# Patient Record
Sex: MALE | Race: WHITE | Employment: FULL TIME | URBAN - METROPOLITAN AREA
[De-identification: names, ages, dates, MRNs, and addresses within clinical notes are randomized per-mention and may not be internally consistent; named-entity substitution may affect disease eponyms.]

---

## 2017-06-07 ENCOUNTER — OFFICE VISIT (OUTPATIENT)
Dept: CARDIOLOGY CLINIC | Age: 47
End: 2017-06-07

## 2017-06-07 VITALS
HEIGHT: 66 IN | HEART RATE: 42 BPM | DIASTOLIC BLOOD PRESSURE: 60 MMHG | SYSTOLIC BLOOD PRESSURE: 116 MMHG | BODY MASS INDEX: 23.56 KG/M2 | RESPIRATION RATE: 18 BRPM | WEIGHT: 146.6 LBS

## 2017-06-07 DIAGNOSIS — R55 NEAR SYNCOPE: ICD-10-CM

## 2017-06-07 DIAGNOSIS — R01.1 HEART MURMUR: ICD-10-CM

## 2017-06-07 DIAGNOSIS — R00.1 BRADYCARDIA: Primary | ICD-10-CM

## 2017-06-07 NOTE — MR AVS SNAPSHOT
Visit Information Date & Time Provider Department Dept. Phone Encounter #  
 6/7/2017 10:00 AM Elda Haines MD [facility] [id number] Follow-up Instructions Follow-up and Disposition History Upcoming Health Maintenance Date Due DTaP/Tdap/Td series (1 - Tdap) 9/8/1992 INFLUENZA AGE 9 TO ADULT 8/1/2017 Allergies as of 6/7/2017  Review Complete On: 6/7/2017 By: Elda Haines MD  
 No Known Allergies Current Immunizations  Never Reviewed No immunizations on file. Not reviewed this visit You Were Diagnosed With   
  
 Codes Comments Bradycardia    -  Primary ICD-10-CM: R00.1 ICD-9-CM: 427.89 Near syncope     ICD-10-CM: R55 
ICD-9-CM: 780.2 Heart murmur     ICD-10-CM: R01.1 ICD-9-CM: 733. 2 Vitals BP Pulse Resp Height(growth percentile) Weight(growth percentile) BMI  
 116/60 (!) 42 18 5' 6\" (1.676 m) 146 lb 9.6 oz (66.5 kg) 23.66 kg/m2 Smoking Status Former Smoker Vitals History BMI and BSA Data Body Mass Index Body Surface Area  
 23.66 kg/m 2 1.76 m 2 Your Updated Medication List  
  
Notice  As of 6/7/2017 11:29 AM  
 You have not been prescribed any medications. We Performed the Following AMB POC EKG ROUTINE W/ 12 LEADS, INTER & REP [15651 CPT(R)] To-Do List   
 06/07/2017 ECHO:  2D ECHO COMPLETE ADULT (TTE) W OR WO CONTR   
  
 06/07/2017 ECHO:  ECHO TTE STRESS EXERCISE TREADMILL COMP   
  
 06/07/2017 ECHO:  ECHO TTE STRESS EXRCSE COMP W OR WO CONTR   
  
 06/15/2017 10:00 AM  
  Appointment with [facility] [address] LAB RM 1; [facility] STRESS LAB at [facility] NON-INVASIVE CARD ([phone]) Age Limit for ALL Heart procedures @ all [facility] facilities: 18 yrs and older only. Under the age of 25 suggest referring to [facility] ([phone]). PATIENTS SHOULD NOT BRING CHILDREN UNATTENDED TO APPTS.     Weight Limit: 350 lbs  1-No eating or coffee after midnight 2-Do not take Beta Blocker or Calcium Channel blocker the day of the test unless specified by cardiologist. Please bring with. 3-Do not take diabetic meds day of test (bring with) 4-Patient will be walking/running on a Treadmill. Patient should wear comfortable shoes that are suitable for walking (NO Sandals/Flip Flops, High Heels, etc.) & comfortable clothing.  
  
 06/15/2017 11:00 AM  
  Appointment with [facility] LAB RM 1 at [facility] NON-INVASIVE CARD ([phone]) Age Limit for ALL Heart procedures @ all [facility] facilities: 18 yrs and older only. Under the age of 25, refer to [address] [place] [address] ([phone]). This study requires patient to bring a written physician's order or MD office may fax the order to Central Scheduling at [phone]. Patient needs to bring a current list of all medications. No preparation is required for this study. Introducing [facility] SERVICES! Ariadna Paulino introduces [employer] patient portal. Now you can access parts of your medical record, email your doctor's office, and request medication refills online. 1. In your internet browser, go to [email or web address] 2. Click on the First Time User? Click Here link in the Sign In box. You will see the New Member Sign Up page. 3. Enter your [employer] Access Code exactly as it appears below. You will not need to use this code after youve completed the sign-up process. If you do not sign up before the expiration date, you must request a new code. · [employer] Access Code: [email or web address] Expires: 9/5/2017  9:53 AM 
 
4. Enter the last four digits of your Social Security Number (xxxx) and Date of Birth (mm/dd/yyyy) as indicated and click Submit. You will be taken to the next sign-up page. 5. Create a [employer] ID. This will be your [employer] login ID and cannot be changed, so think of one that is secure and easy to remember. 6. Create a [employer] password. You can change your password at any time. 7. Enter your Password Reset Question and Answer. This can be used at a later time if you forget your password. 8. Enter your e-mail address. You will receive e-mail notification when new information is available in [address] 9. Click Sign Up. You can now view and download portions of your medical record. 10. Click the Download Summary menu link to download a portable copy of your medical information. If you have questions, please visit the Frequently Asked Questions section of the [employer] website. Remember, [employer] is NOT to be used for urgent needs. For medical emergencies, dial 911. Now available from your iPhone and Android! Please provide this summary of care documentation to your next provider. Your primary care clinician is listed as NOT ON FILE. If you have any questions after today's visit, please call [phone].

## 2017-06-09 ENCOUNTER — HOSPITAL ENCOUNTER (EMERGENCY)
Age: 47
Discharge: HOME | End: 2017-06-09
Attending: EMERGENCY MEDICINE | Admitting: EMERGENCY MEDICINE
Payer: SELF-PAY

## 2017-06-09 VITALS
HEART RATE: 39 BPM | DIASTOLIC BLOOD PRESSURE: 52 MMHG | SYSTOLIC BLOOD PRESSURE: 123 MMHG | TEMPERATURE: 97.9 F | OXYGEN SATURATION: 100 % | RESPIRATION RATE: 16 BRPM

## 2017-06-09 DIAGNOSIS — R00.1 SINUS BRADYCARDIA: ICD-10-CM

## 2017-06-09 DIAGNOSIS — R55 NEAR SYNCOPE: Primary | ICD-10-CM

## 2017-06-09 LAB
ANION GAP BLD CALC-SCNC: 8 MMOL/L (ref 3–18)
ATRIAL RATE: 42 BPM
BASOPHILS # BLD AUTO: 0 K/UL (ref 0–0.06)
BASOPHILS # BLD: 0 % (ref 0–2)
BUN SERPL-MCNC: 21 MG/DL (ref 7–18)
BUN/CREAT SERPL: 30 (ref 12–20)
CALCIUM SERPL-MCNC: 9.5 MG/DL (ref 8.5–10.1)
CALCULATED P AXIS, ECG09: 38 DEGREES
CALCULATED R AXIS, ECG10: 71 DEGREES
CALCULATED T AXIS, ECG11: 65 DEGREES
CHLORIDE SERPL-SCNC: 103 MMOL/L (ref 100–108)
CK MB CFR SERPL CALC: 1.3 % (ref 0–4)
CK MB SERPL-MCNC: 1.8 NG/ML (ref 5–25)
CK SERPL-CCNC: 140 U/L (ref 39–308)
CO2 SERPL-SCNC: 27 MMOL/L (ref 21–32)
CREAT SERPL-MCNC: 0.7 MG/DL (ref 0.6–1.3)
DIAGNOSIS, 93000: NORMAL
DIFFERENTIAL METHOD BLD: ABNORMAL
EOSINOPHIL # BLD: 0.3 K/UL (ref 0–0.4)
EOSINOPHIL NFR BLD: 5 % (ref 0–5)
ERYTHROCYTE [DISTWIDTH] IN BLOOD BY AUTOMATED COUNT: 12.4 % (ref 11.6–14.5)
GLUCOSE SERPL-MCNC: 93 MG/DL (ref 74–99)
HCT VFR BLD AUTO: 39.2 % (ref 36–48)
HGB BLD-MCNC: 13.9 G/DL (ref 13–16)
LYMPHOCYTES # BLD AUTO: 32 % (ref 21–52)
LYMPHOCYTES # BLD: 1.7 K/UL (ref 0.9–3.6)
MAGNESIUM SERPL-MCNC: 2.2 MG/DL (ref 1.6–2.6)
MCH RBC QN AUTO: 30.2 PG (ref 24–34)
MCHC RBC AUTO-ENTMCNC: 35.5 G/DL (ref 31–37)
MCV RBC AUTO: 85 FL (ref 74–97)
MONOCYTES # BLD: 0.4 K/UL (ref 0.05–1.2)
MONOCYTES NFR BLD AUTO: 7 % (ref 3–10)
NEUTS SEG # BLD: 3 K/UL (ref 1.8–8)
NEUTS SEG NFR BLD AUTO: 56 % (ref 40–73)
P-R INTERVAL, ECG05: 158 MS
PLATELET # BLD AUTO: 230 K/UL (ref 135–420)
PMV BLD AUTO: 10.6 FL (ref 9.2–11.8)
POTASSIUM SERPL-SCNC: 3.5 MMOL/L (ref 3.5–5.5)
Q-T INTERVAL, ECG07: 492 MS
QRS DURATION, ECG06: 104 MS
QTC CALCULATION (BEZET), ECG08: 410 MS
RBC # BLD AUTO: 4.61 M/UL (ref 4.7–5.5)
SODIUM SERPL-SCNC: 138 MMOL/L (ref 136–145)
TROPONIN I SERPL-MCNC: <0.02 NG/ML (ref 0–0.04)
VENTRICULAR RATE, ECG03: 42 BPM
WBC # BLD AUTO: 5.4 K/UL (ref 4.6–13.2)

## 2017-06-09 PROCEDURE — 80048 BASIC METABOLIC PNL TOTAL CA: CPT | Performed by: EMERGENCY MEDICINE

## 2017-06-09 PROCEDURE — 85025 COMPLETE CBC W/AUTO DIFF WBC: CPT | Performed by: EMERGENCY MEDICINE

## 2017-06-09 PROCEDURE — 93005 ELECTROCARDIOGRAM TRACING: CPT

## 2017-06-09 PROCEDURE — 82550 ASSAY OF CK (CPK): CPT | Performed by: EMERGENCY MEDICINE

## 2017-06-09 PROCEDURE — 99285 EMERGENCY DEPT VISIT HI MDM: CPT

## 2017-06-09 PROCEDURE — 83735 ASSAY OF MAGNESIUM: CPT | Performed by: EMERGENCY MEDICINE

## 2017-06-09 NOTE — ED TRIAGE NOTES
\"I was driving to work and suddenly felt like I was about to pass out. I couldn't drive anymore so I walked to the fire department and they brought me here. \"

## 2017-06-09 NOTE — LETTER
NOTIFICATION RETURN TO WORK / SCHOOL 
 
6/9/2017 10:26 AM 
 
Mr. Shayla Babcock [address] [address] To Whom It May Concern: 
 
Shayla Babcock is currently under the care of [facility] EMERGENCY DEPT. He will return to work on: Saturday, Janey 10, 2017, however, he is not to drive until reevaluated by his physician. If there are questions or concerns please have the patient contact our office. Sincerely, Heber Brown MD

## 2017-06-09 NOTE — ED PROVIDER NOTES
HPI   40 y o smoker with history of bradycardia under care of Dr Beltran Milner, had episode of dizziness (lightheadedness) today. He was driving his truck, sudden onset of lightheadedness which he has hd before. Some numbness into his left arm. 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 No chest pain, arm/jaw pain or  SOB. No sweating or nausea. This week he's supposed to get a cardiac event monitor to try to further ilucidate these episodes. Has known sinus bradycardia at baseline. He's cut back his smoking to about 3 cigarettes per week. Boarderline ekg changes with stress test and Neg cardiac cath in 2014     Past Medical History:   Diagnosis Date    Bradycardia     History of myocardial perfusion scan 03/05/2014    No ischemia or prior infarction. EF 53%. No RWMA. Borderline positive EKG on pharm stress test.       Past Surgical History:   Procedure Laterality Date    HX HEART CATHETERIZATION  3/7/14         Family History:   Problem Relation Age of Onset    Heart Attack Father     Heart Surgery Father     Coronary Artery Disease Father        Social History     Social History    Marital status:      Spouse name: N/A    Number of children: N/A    Years of education: N/A     Occupational History    Not on file. Social History Main Topics    Smoking status: Former Smoker     Types: Cigarettes     Quit date: 12/4/2013    Smokeless tobacco: Current User    Alcohol use No      Comment: Quit 4 months ago    Drug use: Yes     Special: Marijuana    Sexual activity: Not on file     Other Topics Concern    Not on file     Social History Narrative         ALLERGIES: Review of patient's allergies indicates no known allergies. Review of Systems   Constitutional: Negative for diaphoresis and fever. HENT: Negative for congestion and sore throat. Eyes: Negative for pain and itching. Respiratory: Negative for cough, chest tightness and shortness of breath. Cardiovascular: Negative for chest pain and palpitations. Gastrointestinal: Negative for abdominal pain and diarrhea. Endocrine: Negative for polydipsia and polyuria. Genitourinary: Negative for dysuria and hematuria. Musculoskeletal: Negative for arthralgias and myalgias. Skin: Negative for rash and wound. Neurological: Negative for seizures and syncope. Hematological: Does not bruise/bleed easily. Psychiatric/Behavioral: Negative for agitation and hallucinations. Vitals:    06/09/17 0841   BP: 121/65   Pulse: (!) 38   Resp: 16   Temp: 97.9 °F (36.6 °C)   SpO2: 100%            Physical Exam   Constitutional: He appears well-developed and well-nourished. HENT:   Head: Normocephalic and atraumatic. Eyes: Conjunctivae are normal. No scleral icterus. Neck: Normal range of motion. Neck supple. No JVD present. Cardiovascular: Regular rhythm and normal heart sounds. Bradycardia present. 4 intact extremity pulses   Pulmonary/Chest: Effort normal and breath sounds normal.   Abdominal: Soft. Bowel sounds are normal.   Musculoskeletal: Normal range of motion. Lymphadenopathy:     He has no cervical adenopathy. Neurological: He is alert. He has normal strength. No cranial nerve deficit or sensory deficit. Skin: Skin is warm and dry. Nursing note and vitals reviewed. MDM  ED Course   He's neurologically intact and no vertiginous symptoms so CVA unlikely. ACS unlikely as he had no cp/sob/nausea/diaphoresis. HEART score 3 (age, EKG, smoking)  Episode was near syncope so will ensure electrolytes are normal, then sounds like Dr Shakira Moctezuma wants a cardiac event monitor so will likely be discharged and get event monitor.      Procedures     Vitals:  Patient Vitals for the past 12 hrs:   Temp Pulse Resp BP SpO2   06/09/17 1000 - (!) 39 16 123/52 100 %   06/09/17 0945 - (!) 39 13 108/59 97 %   06/09/17 0930 - (!) 40 16 125/58 100 %   06/09/17 0915 - (!) 41 17 116/61 100 %   06/09/17 0900 - (!) 54 15 (!) 121/107 100 %   06/09/17 0845 - (!) 46 17 118/64 100 %   06/09/17 0841 97.9 °F (36.6 °C) (!) 38 16 121/65 100 %         Medications ordered:   Medications - No data to display      Lab findings:  Recent Results (from the past 12 hour(s))   EKG, 12 LEAD, INITIAL    Collection Time: 06/09/17  8:39 AM   Result Value Ref Range    Ventricular Rate 42 BPM    Atrial Rate 42 BPM    P-R Interval 158 ms    QRS Duration 104 ms    Q-T Interval 492 ms    QTC Calculation (Bezet) 410 ms    Calculated P Axis 38 degrees    Calculated R Axis 71 degrees    Calculated T Axis 65 degrees    Diagnosis       Marked sinus bradycardia  Minimal voltage criteria for LVH, may be normal variant  Abnormal ECG  When compared with ECG of 07-MAR-2014 14:26,  No significant change was found     METABOLIC PANEL, BASIC    Collection Time: 06/09/17  9:00 AM   Result Value Ref Range    Sodium 138 136 - 145 mmol/L    Potassium 3.5 3.5 - 5.5 mmol/L    Chloride 103 100 - 108 mmol/L    CO2 27 21 - 32 mmol/L    Anion gap 8 3.0 - 18 mmol/L    Glucose 93 74 - 99 mg/dL    BUN 21 (H) 7.0 - 18 MG/DL    Creatinine 0.70 0.6 - 1.3 MG/DL    BUN/Creatinine ratio 30 (H) 12 - 20      GFR est AA >60 >60 ml/min/1.73m2    GFR est non-AA >60 >60 ml/min/1.73m2    Calcium 9.5 8.5 - 10.1 MG/DL   MAGNESIUM    Collection Time: 06/09/17  9:00 AM   Result Value Ref Range    Magnesium 2.2 1.6 - 2.6 mg/dL   CBC WITH AUTOMATED DIFF    Collection Time: 06/09/17  9:00 AM   Result Value Ref Range    WBC 5.4 4.6 - 13.2 K/uL    RBC 4.61 (L) 4.70 - 5.50 M/uL    HGB 13.9 13.0 - 16.0 g/dL    HCT 39.2 36.0 - 48.0 %    MCV 85.0 74.0 - 97.0 FL    MCH 30.2 24.0 - 34.0 PG    MCHC 35.5 31.0 - 37.0 g/dL    RDW 12.4 11.6 - 14.5 %    PLATELET 455 098 - 920 K/uL    MPV 10.6 9.2 - 11.8 FL    NEUTROPHILS 56 40 - 73 %    LYMPHOCYTES 32 21 - 52 %    MONOCYTES 7 3 - 10 %    EOSINOPHILS 5 0 - 5 %    BASOPHILS 0 0 - 2 %    ABS. NEUTROPHILS 3.0 1.8 - 8.0 K/UL    ABS.  LYMPHOCYTES 1.7 0.9 - 3.6 K/UL    ABS. MONOCYTES 0.4 0.05 - 1.2 K/UL    ABS. EOSINOPHILS 0.3 0.0 - 0.4 K/UL    ABS. BASOPHILS 0.0 0.0 - 0.06 K/UL    DF AUTOMATED     CARDIAC PANEL,(CK, CKMB & TROPONIN)    Collection Time: 06/09/17  9:00 AM   Result Value Ref Range     39 - 308 U/L    CK - MB 1.8 <3.6 ng/ml    CK-MB Index 1.3 0.0 - 4.0 %    Troponin-I, Qt. <0.02 0.0 - 0.045 NG/ML       EKG interpretation by ED Physician:  Sinus dinah at 42 borderline repol changes, unchanged from prior    X-Ray, CT or other radiology findings or impressions:  No orders to display       Progress notes, Consult notes or additional Procedure notes:   10:05 AM discussed normal results with pt. Will page Dr Cali Rodriguez  10:19 AM CHAR Hopson returned the page, discussed care, ekg, labs, cardiology is okay with discharge, cardiac event monitor to be mailed to pt, pt will follow up with cardiology. D/w pt, he's happy with the plan, no driving until cleared by cardiology. Questions answered and he's happy with the plan. Reevaluation of patient:       Disposition:  Diagnosis:   1. Near syncope    2.  Sinus bradycardia        Disposition: home    Follow-up Information     None            Patient's Medications    No medications on file

## 2017-06-09 NOTE — DISCHARGE INSTRUCTIONS
Bradycardia: Care Instructions  Your Care Instructions    Bradycardia is a slow heart rate. If your heart beats too slowly, it can't supply your body with enough blood. This can make you weak or dizzy. Or it may make you pass out. Sometimes medicine can cause this problem. If this happens, your doctor may have you adjust one of your medicines. If a medicine is not the problem, your doctor may recommend a pacemaker. It is important to treat bradycardia so that you don't get more serious health problems. Your doctor will want to see you on a routine schedule to make sure that your heartbeat is normal.  Follow-up care is a key part of your treatment and safety. Be sure to make and go to all appointments, and call your doctor if you are having problems. It's also a good idea to know your test results and keep a list of the medicines you take. How can you care for yourself at home? · Take your medicines exactly as prescribed. Call your doctor if you think you are having a problem with your medicine. If your bradycardia is caused by another disease, your doctor will try to treat the disease. If it is caused by heart medicines, he or she will adjust your medicines. · Make lifestyle changes to improve your heart health. ¨ To control your cholesterol, avoid foods with a lot of fat, saturated fat, or sodium. Try to eat more fiber. And if your doctor says it's okay, get some exercise on most days. ¨ Do not smoke. Smoking can make your heart condition worse. If you need help quitting, talk to your doctor about stop-smoking programs and medicines. These can increase your chances of quitting for good. ¨ Limit alcohol to 2 drinks a day for men and 1 drink a day for women. Too much alcohol can cause health problems. Pacemaker  If you have a pacemaker, you will get more specific information about it. Be sure to:  · Check your pulse as your doctor tells you.   · Have your pacemaker checked as often as your doctor recommends. You may be able to do this over the phone or computer. · Avoid strong magnetic or electrical fields. These include wand metal detectors used in airports, MRIs, welding equipment, and generators. · You will be checked several times right after you get your pacemaker and when it is time to have the battery changed. Batteries last for 5 to 15 years. · You can talk on a cell phone. But keep it 6 inches away from your pacemaker. · Microwaves, TVs, radios, and kitchen and bathroom appliances won't harm you. When should you call for help? Call 911 anytime you think you may need emergency care. For example, call if:  · You passed out (lost consciousness). · You have symptoms of a heart attack. These may include:  ¨ Chest pain or pressure, or a strange feeling in the chest.  ¨ Sweating. ¨ Shortness of breath. ¨ Nausea or vomiting. ¨ Pain, pressure, or a strange feeling in the back, neck, jaw, or upper belly or in one or both shoulders or arms. ¨ Lightheadedness or sudden weakness. ¨ A fast or irregular heartbeat. After you call 911, the  may tell you to chew 1 adult-strength or 2 to 4 low-dose aspirin. Wait for an ambulance. Do not try to drive yourself. · You have symptoms of a stroke. These may include:  ¨ Sudden numbness, tingling, weakness, or loss of movement in your face, arm, or leg, especially on only one side of your body. ¨ Sudden vision changes. ¨ Sudden trouble speaking. ¨ Sudden confusion or trouble understanding simple statements. ¨ Sudden problems with walking or balance. ¨ A sudden, severe headache that is different from past headaches. Call your doctor now or seek immediate medical care if:  · You are dizzy or lightheaded, or you feel like you may faint. · You have new or increased shortness of breath. · You had a pacemaker implanted and you have signs of infection, such as:  ¨ Increased pain, swelling, warmth, or redness.   ¨ Red streaks leading from the cut (incision). ¨ Pus draining from the incision. ¨ A fever. Watch closely for changes in your health, and be sure to contact your doctor if you have any problems. Where can you learn more? Go to http://jignesh-angelique.info/. Enter B448 in the search box to learn more about \"Bradycardia: Care Instructions. \"  Current as of: January 27, 2016  Content Version: 11.2  © 8154-2844 [employer]. Care instructions adapted under license by [employer] (which disclaims liability or warranty for this information). If you have questions about a medical condition or this instruction, always ask your healthcare professional. [place] [address] any warranty or liability for your use of this information. Cardiac Arrhythmia: Care Instructions  Your Care Instructions    A cardiac arrhythmia is a change in the normal rhythm of the heart. Your heart may beat too fast or too slow or beat with an irregular or skipping rhythm. A change in the heart's rhythm may feel like a really strong heartbeat or a fluttering in your chest. A severe heart rhythm problem can keep the body from getting the blood it needs. This can result in shortness of breath, lightheadedness, and fainting. You may take medicine to treat your condition. Your doctor may recommend a pacemaker or recommend catheter ablation to destroy small parts of the heart that are causing a rhythm problem. Another possible treatment is an implantable cardioverter-defibrillator (ICD). An ICD is a device that gives the heart a shock to return the heart to a normal rhythm. Follow-up care is a key part of your treatment and safety. Be sure to make and go to all appointments, and call your doctor if you are having problems. It's also a good idea to know your test results and keep a list of the medicines you take. How can you care for yourself at home? General care  · Be safe with medicines.  Take your medicines exactly as prescribed. Call your doctor if you think you are having a problem with your medicine. You will get more details on the specific medicines your doctor prescribes. · If you received a pacemaker or an ICD, you will get a fact sheet about it. · Wear medical alert jewelry that says you have an abnormal heart rhythm. You can buy this at most drugstores. Lifestyle changes  · Eat a heart-healthy diet. · Stay at a healthy weight. Lose weight if you need to. · Avoid nicotine, too much alcohol, and illegal drugs (meth, speed, and cocaine). Also, get enough sleep and do not overeat. · Ask your doctor whether you can take over-the-counter medicines (such as decongestants). These can make your heart beat fast.  · Talk to your doctor about any limits to activities, such as driving, or tasks where you use power tools or ladders. Activity  · Start light exercise if your doctor says you can. Even a small amount will help you get stronger, have more energy, and manage your stress. · If your doctor recommends it, get more exercise. Walking is a good choice. Bit by bit, increase the amount you walk every day. Try for at least 30 minutes on most days of the week. You also may want to swim, bike, or do other activities. · When you exercise, watch for signs that your heart is working too hard. You are pushing too hard if you cannot talk while you exercise. If you become short of breath or dizzy or have chest pain, sit down and rest.  · Check your pulse daily. Place two fingers on the artery at the palm side of your wrist, in line with your thumb. If your heartbeat seems uneven, talk to your doctor. When should you call for help? Call 911 anytime you think you may need emergency care. For example, call if:  · You passed out (lost consciousness). · You have symptoms of a heart attack. These may include:  ¨ Chest pain or pressure, or a strange feeling in the chest.  ¨ Sweating. ¨ Shortness of breath.   ¨ Nausea or vomiting. ¨ Pain, pressure, or a strange feeling in the back, neck, jaw, or upper belly or in one or both shoulders or arms. ¨ Lightheadedness or sudden weakness. ¨ A fast or irregular heartbeat. After you call 911, the  may tell you to chew 1 adult-strength or 2 to 4 low-dose aspirin. Wait for an ambulance. Do not try to drive yourself. · You have signs of a stroke. These include:  ¨ Sudden numbness, paralysis, or weakness in your face, arm, or leg, especially on only one side of your body. ¨ New problems with walking or balance. ¨ Sudden vision changes. ¨ Drooling or slurred speech. ¨ New problems speaking or understanding simple statements, or feeling confused. ¨ A sudden, severe headache that is different from past headaches. Call your doctor now or seek immediate medical care if:  · You are dizzy or lightheaded, or you feel like you may faint. · You have new or increased shortness of breath. · You had surgery and you have signs of infection, such as:  ¨ Increased pain, swelling, warmth, or redness. ¨ Red streaks leading from the cut (incision). ¨ Pus draining from the incision. ¨ A fever. Watch closely for changes in your health, and be sure to contact your doctor if:  · You do not get better as expected. Where can you learn more? Go to http://jignesh-angelique.info/. Enter F835 in the search box to learn more about \"Cardiac Arrhythmia: Care Instructions. \"  Current as of: May 5, 2016  Content Version: 11.2  © 7703-6409 [employer]. Care instructions adapted under license by [employer] (which disclaims liability or warranty for this information). If you have questions about a medical condition or this instruction, always ask your healthcare professional. [address] any warranty or liability for your use of this information.

## 2017-06-15 ENCOUNTER — HOSPITAL ENCOUNTER (OUTPATIENT)
Dept: NON INVASIVE DIAGNOSTICS | Age: 47
Discharge: HOME | End: 2017-06-15
Payer: SELF-PAY

## 2017-06-15 DIAGNOSIS — R01.1 HEART MURMUR: ICD-10-CM

## 2017-06-15 DIAGNOSIS — R55 NEAR SYNCOPE: ICD-10-CM

## 2017-06-15 DIAGNOSIS — R00.1 BRADYCARDIA: ICD-10-CM

## 2017-06-15 LAB
ATTENDING PHYSICIAN, CST07: NORMAL
DIAGNOSIS, 93000: NORMAL
DUKE TM SCORE RESULT, CST14: NORMAL
DUKE TREADMILL SCORE, CST13: NORMAL
ECG INTERP BEFORE EX, CST11: NORMAL
ECG INTERP DURING EX, CST12: NORMAL
FUNCTIONAL CAPACITY, CST17: NORMAL
KNOWN CARDIAC CONDITION, CST08: NORMAL
MAX. DIASTOLIC BP, CST04: 72 MMHG
MAX. HEART RATE, CST05: 166 BPM
MAX. SYSTOLIC BP, CST03: 120 MMHG
OVERALL BP RESPONSE TO EXERCISE, CST16: NORMAL
OVERALL HR RESPONSE TO EXERCISE, CST15: NORMAL
PEAK EX METS, CST10: 13.4 METS
PROTOCOL NAME, CST01: NORMAL
TEST INDICATION, CST09: NORMAL

## 2017-06-15 PROCEDURE — 93351 STRESS TTE COMPLETE: CPT

## 2017-06-15 PROCEDURE — 93306 TTE W/DOPPLER COMPLETE: CPT

## 2017-06-16 NOTE — PROGRESS NOTES
Per last office visit:    IMPRESSION:   Near-syncope unknown etiology  Sinus bradycardia  Left arm pain and neck pain unknown etiology  Heart murmur     PLAN:  Stress echocardiography  Cardiac event recorder  Return to office after the event recorder

## 2017-06-16 NOTE — PROGRESS NOTES
Per last office note:    IMPRESSION:   Near-syncope unknown etiology  Sinus bradycardia  Left arm pain and neck pain unknown etiology  Heart murmur     PLAN:  Stress echocardiography  Cardiac event recorder  Return to office after the event recorder

## 2017-07-27 ENCOUNTER — OFFICE VISIT (OUTPATIENT)
Dept: CARDIOLOGY CLINIC | Age: 47
End: 2017-07-27

## 2017-07-27 VITALS
HEIGHT: 66 IN | DIASTOLIC BLOOD PRESSURE: 60 MMHG | HEART RATE: 42 BPM | BODY MASS INDEX: 24.43 KG/M2 | OXYGEN SATURATION: 99 % | WEIGHT: 152 LBS | SYSTOLIC BLOOD PRESSURE: 128 MMHG

## 2017-07-27 DIAGNOSIS — R07.9 CHEST PAIN, UNSPECIFIED TYPE: ICD-10-CM

## 2017-07-27 DIAGNOSIS — R00.2 PALPITATION: Primary | ICD-10-CM

## 2017-07-27 DIAGNOSIS — R00.1 BRADYCARDIA: ICD-10-CM

## 2017-07-27 NOTE — PROGRESS NOTES
PATIENT NAME: Devante Montes De Oca         40 y.o.      1970              DATE:7/27/2017    REASON FOR VISIT: Palpitations    HISTORY OF PRESENT ILLNESS: The patient's arm discomfort has not recurred. No recurrence of chest discomfort. Stress echocardiography normal.  Exercise EKG normal.  Echocardiogram negative for valvular disease. Experiences occasional palpitation. The description sound like premature complexes. His event recorder was unremarkable. PAST MEDICAL HISTORY:   Past Medical History:  No date: Bradycardia  03/05/2014: History of myocardial perfusion scan      Comment: No ischemia or prior infarction. EF 53%. No                RWMA. Borderline positive EKG on pharm stress                test.    PAST SURGICAL HISTORY:   Past Surgical History:  3/7/14: HX HEART CATHETERIZATION      SOCIAL HISTORY:  Social History    Marital status:              Spouse name:                       Years of education:                 Number of children:               Social History Main Topics    Smoking status: Former Smoker                                                                Packs/day: 0.00      Years: 0.00           Types: Cigarettes       Quit date: 12/4/2013    Smokeless status: Current User                      Alcohol use: No                 Comment: Quit 4 months ago    Drug use: Yes                Special: Marijuana      ALLERGIES:   No Known Allergies     CURRENT MEDICATIONS:   No current outpatient prescriptions on file. No current facility-administered medications for this visit.        REVIEW of SYSTEMS:Cardiovascular ROS: See history of present illness     PHYSICAL EXAMINATION:   /60  Pulse (!) 42  Ht 5' 6\" (1.676 m)  Wt 68.9 kg (152 lb)  SpO2 99%  BMI 24.53 kg/m2  BP Readings from Last 3 Encounters:  07/27/17 : 128/60  06/09/17 : 123/52  06/07/17 : 116/60    Pulse Readings from Last 3 Encounters:  07/27/17 : (!) 42  06/09/17 : (!) 39  06/07/17 : (!) 42    Wt Readings from Last 3 Encounters:  07/27/17 : 68.9 kg (152 lb)  06/07/17 : 66.5 kg (146 lb 9.6 oz)  09/21/15 : 72.6 kg (160 lb)    Neck: No jugular venous distention. Chest: Clear to auscultation. Heart: Regular rhythm. No gallop audible. 1/6 to 2/6 systolic murmur along the left sternal border. Extremities: No edema      IMPRESSION:   Left arm and left cervical discomfort noncardiac. Resolved. No evidence for myocardial ischemia on exercise EKG or stress echo  The heart murmur is functional.  No evidence for valvular disease on echo  The palpitations are probably due to premature complexes although these were not seen on an event recorder. PLAN:  No further cardiac testing indicated at this time. Return if the palpitations should increase in frequency or severity. The diagnoses and plan were discussed with patient. All questions answered. Plan of care agreed to by all concerned. Johnna Charlton.  MD Verna       ,

## 2017-07-27 NOTE — PROGRESS NOTES
1. Have you been to the ER, urgent care clinic since your last visit? Hospitalized since your last visit? Yes, 6/9/17 for dizziness    2. Have you seen or consulted any other health care providers outside of the [address] [place] [address] since your last visit? Include any pap smears or colon screening.   No

## 2018-08-06 ENCOUNTER — HOSPITAL ENCOUNTER (EMERGENCY)
Age: 48
Discharge: HOME | End: 2018-08-06
Attending: EMERGENCY MEDICINE
Payer: SELF-PAY

## 2018-08-06 ENCOUNTER — APPOINTMENT (OUTPATIENT)
Dept: GENERAL RADIOLOGY | Age: 48
End: 2018-08-06
Attending: PHYSICIAN ASSISTANT
Payer: SELF-PAY

## 2018-08-06 VITALS
HEART RATE: 62 BPM | SYSTOLIC BLOOD PRESSURE: 132 MMHG | OXYGEN SATURATION: 98 % | RESPIRATION RATE: 16 BRPM | HEIGHT: 66 IN | WEIGHT: 150 LBS | TEMPERATURE: 98.5 F | BODY MASS INDEX: 24.11 KG/M2 | DIASTOLIC BLOOD PRESSURE: 75 MMHG

## 2018-08-06 DIAGNOSIS — L03.116 CELLULITIS OF LEFT FOOT: ICD-10-CM
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
## 2018-08-06 LAB
ALBUMIN SERPL-MCNC: 3.8 G/DL (ref 3.4–5)
ALBUMIN/GLOB SERPL: 1 {RATIO} (ref 0.8–1.7)
ALP SERPL-CCNC: 54 U/L (ref 45–117)
ALT SERPL-CCNC: 31 U/L (ref 16–61)
ANION GAP SERPL CALC-SCNC: 8 MMOL/L (ref 3–18)
AST SERPL-CCNC: 17 U/L (ref 15–37)
BASOPHILS # BLD: 0 K/UL (ref 0–0.1)
BASOPHILS NFR BLD: 0 % (ref 0–2)
BILIRUB SERPL-MCNC: 0.2 MG/DL (ref 0.2–1)
BUN SERPL-MCNC: 16 MG/DL (ref 7–18)
BUN/CREAT SERPL: 24 (ref 12–20)
CALCIUM SERPL-MCNC: 8.5 MG/DL (ref 8.5–10.1)
CHLORIDE SERPL-SCNC: 106 MMOL/L (ref 100–108)
CO2 SERPL-SCNC: 29 MMOL/L (ref 21–32)
CREAT SERPL-MCNC: 0.67 MG/DL (ref 0.6–1.3)
DIFFERENTIAL METHOD BLD: ABNORMAL
EOSINOPHIL # BLD: 0.3 K/UL (ref 0–0.4)
EOSINOPHIL NFR BLD: 3 % (ref 0–5)
ERYTHROCYTE [DISTWIDTH] IN BLOOD BY AUTOMATED COUNT: 12.8 % (ref 11.6–14.5)
GLOBULIN SER CALC-MCNC: 3.8 G/DL (ref 2–4)
GLUCOSE SERPL-MCNC: 104 MG/DL (ref 74–99)
HCT VFR BLD AUTO: 39 % (ref 36–48)
HGB BLD-MCNC: 13.8 G/DL (ref 13–16)
LYMPHOCYTES # BLD: 1.5 K/UL (ref 0.9–3.6)
LYMPHOCYTES NFR BLD: 19 % (ref 21–52)
MCH RBC QN AUTO: 30.1 PG (ref 24–34)
MCHC RBC AUTO-ENTMCNC: 35.4 G/DL (ref 31–37)
MCV RBC AUTO: 85.2 FL (ref 74–97)
MONOCYTES # BLD: 0.8 K/UL (ref 0.05–1.2)
MONOCYTES NFR BLD: 10 % (ref 3–10)
NEUTS SEG # BLD: 5.5 K/UL (ref 1.8–8)
NEUTS SEG NFR BLD: 68 % (ref 40–73)
PLATELET # BLD AUTO: 241 K/UL (ref 135–420)
PMV BLD AUTO: 10.4 FL (ref 9.2–11.8)
POTASSIUM SERPL-SCNC: 4.1 MMOL/L (ref 3.5–5.5)
PROT SERPL-MCNC: 7.6 G/DL (ref 6.4–8.2)
RBC # BLD AUTO: 4.58 M/UL (ref 4.7–5.5)
SODIUM SERPL-SCNC: 143 MMOL/L (ref 136–145)
WBC # BLD AUTO: 8.1 K/UL (ref 4.6–13.2)

## 2018-08-06 PROCEDURE — 73630 X-RAY EXAM OF FOOT: CPT

## 2018-08-06 PROCEDURE — 74011250637 HC RX REV CODE- 250/637: Performed by: PHYSICIAN ASSISTANT

## 2018-08-06 PROCEDURE — 99282 EMERGENCY DEPT VISIT SF MDM: CPT

## 2018-08-06 PROCEDURE — 80053 COMPREHEN METABOLIC PANEL: CPT | Performed by: PHYSICIAN ASSISTANT

## 2018-08-06 PROCEDURE — 85025 COMPLETE CBC W/AUTO DIFF WBC: CPT | Performed by: PHYSICIAN ASSISTANT

## 2018-08-06 RX ORDER — ACETAMINOPHEN AND CODEINE PHOSPHATE 300; 30 MG/1; MG/1
1 TABLET ORAL
Qty: 10 TAB | Refills: 0 | Status: SHIPPED | OUTPATIENT
Start: 2018-08-06 | End: 2020-04-22

## 2018-08-06 RX ORDER — NAPROXEN 375 MG/1
375 TABLET ORAL 2 TIMES DAILY WITH MEALS
Qty: 20 TAB | Refills: 0 | Status: SHIPPED | OUTPATIENT
Start: 2018-08-06 | End: 2020-04-22

## 2018-08-06 RX ORDER — CIPROFLOXACIN 500 MG/1
500 TABLET ORAL 2 TIMES DAILY
Qty: 14 TAB | Refills: 0 | Status: SHIPPED | OUTPATIENT
Start: 2018-08-06 | End: 2018-08-13

## 2018-08-06 RX ORDER — CEPHALEXIN 500 MG/1
500 CAPSULE ORAL 2 TIMES DAILY
Qty: 14 CAP | Refills: 0 | Status: SHIPPED | OUTPATIENT
Start: 2018-08-06 | End: 2018-08-13

## 2018-08-06 RX ORDER — CIPROFLOXACIN 500 MG/1
500 TABLET ORAL
Status: COMPLETED | OUTPATIENT
Start: 2018-08-06 | End: 2018-08-06

## 2018-08-06 RX ORDER — CIPROFLOXACIN 500 MG/1
500 TABLET ORAL
Status: DISCONTINUED | OUTPATIENT
Start: 2018-08-06 | End: 2018-08-06

## 2018-08-06 RX ADMIN — CIPROFLOXACIN HYDROCHLORIDE 500 MG: 500 TABLET, FILM COATED ORAL at 17:17

## 2018-08-06 NOTE — LETTER
[address] [place] [address] [facility] EMERGENCY DEPT 
[address] [facility] [address] 
[phone] Work/School Note Date: 8/6/2018 To Whom It May concern: 
 
Shabana Samuels was seen and treated today in the emergency room by the following provider(s): 
Attending Provider: Saba Self MD 
Physician Assistant: CHAR Brennan. Shabana Samuels may return to work on 8/9/18 Sincerely, CHAR Brennan

## 2018-08-06 NOTE — DISCHARGE INSTRUCTIONS
Cellulitis: Care Instructions  Your Care Instructions    Cellulitis is a skin infection caused by bacteria, most often strep or staph. It often occurs after a break in the skin from a scrape, cut, bite, or puncture, or after a rash. Cellulitis may be treated without doing tests to find out what caused it. But your doctor may do tests, if needed, to look for a specific bacteria, like methicillin-resistant Staphylococcus aureus (MRSA). The doctor has checked you carefully, but problems can develop later. If you notice any problems or new symptoms, get medical treatment right away. Follow-up care is a key part of your treatment and safety. Be sure to make and go to all appointments, and call your doctor if you are having problems. It's also a good idea to know your test results and keep a list of the medicines you take. How can you care for yourself at home? · Take your antibiotics as directed. Do not stop taking them just because you feel better. You need to take the full course of antibiotics. · Prop up the infected area on pillows to reduce pain and swelling. Try to keep the area above the level of your heart as often as you can. · If your doctor told you how to care for your wound, follow your doctor's instructions. If you did not get instructions, follow this general advice:  ¨ Wash the wound with clean water 2 times a day. Don't use hydrogen peroxide or alcohol, which can slow healing. ¨ You may cover the wound with a thin layer of petroleum jelly, such as Vaseline, and a nonstick bandage. ¨ Apply more petroleum jelly and replace the bandage as needed. · Be safe with medicines. Take pain medicines exactly as directed. ¨ If the doctor gave you a prescription medicine for pain, take it as prescribed. ¨ If you are not taking a prescription pain medicine, ask your doctor if you can take an over-the-counter medicine.   To prevent cellulitis in the future  · Try to prevent cuts, scrapes, or other injuries to your skin. Cellulitis most often occurs where there is a break in the skin. · If you get a scrape, cut, mild burn, or bite, wash the wound with clean water as soon as you can to help avoid infection. Don't use hydrogen peroxide or alcohol, which can slow healing. · If you have swelling in your legs (edema), support stockings and good skin care may help prevent leg sores and cellulitis. · Take care of your feet, especially if you have diabetes or other conditions that increase the risk of infection. Wear shoes and socks. Do not go barefoot. If you have athlete's foot or other skin problems on your feet, talk to your doctor about how to treat them. When should you call for help? Call your doctor now or seek immediate medical care if:    · You have signs that your infection is getting worse, such as:  ¨ Increased pain, swelling, warmth, or redness. ¨ Red streaks leading from the area. ¨ Pus draining from the area. ¨ A fever.     · You get a rash.    Watch closely for changes in your health, and be sure to contact your doctor if:    · You do not get better as expected. Where can you learn more? Go to http://jignesh-angelique.info/. Hira Lujan in the search box to learn more about \"Cellulitis: Care Instructions. \"  Current as of: May 10, 2017  Content Version: 11.7  © 4538-6107 [employer]. Care instructions adapted under license by [employer] (which disclaims liability or warranty for this information). If you have questions about a medical condition or this instruction, always ask your healthcare professional. [place] [address] any warranty or liability for your use of this information.

## 2018-08-06 NOTE — ED TRIAGE NOTES
Pt stepped on caity nail on Saturday. Pt complaining of redness and pain to left foot. Tetanus over 3 years ago.

## 2018-08-06 NOTE — ED PROVIDER NOTES
EMERGENCY DEPARTMENT HISTORY AND PHYSICAL EXAM    2:33 PM      Date: 8/6/2018  Patient Name: Zamzam Lanza    History of Presenting Illness     Chief Complaint   Patient presents with    Wound Check         History Provided By: Patient    Chief Complaint: left foot pain  Duration:  Days  Timing:  Constant  Location: left foot  Quality: Aching  Severity: Severe  Modifying Factors: using the left foot to ambulate exacerbates pain   Associated Symptoms: denies any other associated signs or symptoms      Additional History (Context): Zamzam Lanza is a 56 y.o. male with No significant past medical history who presents with severe left foot pain after stepping on a caity nail at work Saturday. Pt says he had shoes and socks on when he stepped on the nail. After the injury says he cleaned the area and has been soaking it in Epson salt but not having relief of sx. Has not taken any OTC medication for sx. Has had his tetanus shot done 3-4 years ago. Denies fever, chills, swelling, redness, or any other associated sx. No other complaints or concerns. PCP: PROVIDER UNKNOWN        Past History     Past Medical History:  Past Medical History:   Diagnosis Date    Bradycardia     History of myocardial perfusion scan 03/05/2014    No ischemia or prior infarction. EF 53%. No RWMA.   Borderline positive EKG on pharm stress test.       Past Surgical History:  Past Surgical History:   Procedure Laterality Date    HX HEART CATHETERIZATION  3/7/14       Family History:  Family History   Problem Relation Age of Onset    Heart Attack Father     Heart Surgery Father     Coronary Artery Disease Father        Social History:  Social History   Substance Use Topics    Smoking status: Former Smoker     Types: Cigarettes     Quit date: 12/4/2013    Smokeless tobacco: Current User    Alcohol use No      Comment: Quit 4 months ago       Allergies:  No Known Allergies      Review of Systems       Review of Systems Musculoskeletal: Positive for arthralgias. Negative for joint swelling. Skin: Negative for color change. All other systems reviewed and are negative. Physical Exam     Visit Vitals    /75    Pulse 62    Temp 98.5 °F (36.9 °C)    Resp 16    Ht 5' 6\" (1.676 m)    Wt 68 kg (150 lb)    SpO2 98%    BMI 24.21 kg/m2         Physical Exam   Constitutional: He appears well-developed and well-nourished. No distress. HENT:   Head: Normocephalic and atraumatic. Right Ear: External ear normal.   Left Ear: External ear normal.   Mouth/Throat: Oropharynx is clear and moist.   Eyes: Conjunctivae are normal.   Neck: Normal range of motion. Neck supple. Musculoskeletal:        Feet:    Skin: Skin is warm and dry. He is not diaphoretic. Psychiatric: He has a normal mood and affect. Diagnostic Study Results     Labs -  No results found for this or any previous visit (from the past 12 hour(s)). Radiologic Studies -   No orders to display         Medical Decision Making   I am the first provider for this patient. I reviewed the vital signs, available nursing notes, past medical history, past surgical history, family history and social history. Vital Signs-Reviewed the patient's vital signs. Records Reviewed: Nursing Notes and Old Medical Records (Time of Review: 2:33 PM)    Provider Notes (Medical Decision Making): Pt c/o left foot pain worsening after stepping on caity nail on Sat. There is cellulitis developing. Labs normal. Xray w/o FB. Will rx for abx. Wound recheck in 2 days. Diagnosis     Clinical Impression: No diagnosis found.     Disposition: d/c to home    Follow-up Information     None           Patient's Medications    No medications on file     _______________________________    Attestations:  Rosa Melchor acting as a scribe for and in the presence of Galo Mcleod      August 06, 2018 at 2:33 PM       Provider Attestation:      I personally performed the services described in the documentation, reviewed the documentation, as recorded by the scribe in my presence, and it accurately and completely records my words and actions.  August 06, 2018 at 2:33 PM - CHAR Arellano   _______________________________

## 2020-04-22 ENCOUNTER — HOSPITAL ENCOUNTER (EMERGENCY)
Age: 50
Discharge: HOME | End: 2020-04-22
Attending: EMERGENCY MEDICINE
Payer: COMMERCIAL

## 2020-04-22 ENCOUNTER — APPOINTMENT (OUTPATIENT)
Dept: GENERAL RADIOLOGY | Age: 50
End: 2020-04-22
Attending: PHYSICIAN ASSISTANT
Payer: COMMERCIAL

## 2020-04-22 VITALS
HEART RATE: 51 BPM | OXYGEN SATURATION: 100 % | DIASTOLIC BLOOD PRESSURE: 82 MMHG | RESPIRATION RATE: 16 BRPM | SYSTOLIC BLOOD PRESSURE: 141 MMHG | TEMPERATURE: 97 F

## 2020-04-22 DIAGNOSIS — R05.9 COUGH: ICD-10-CM

## 2020-04-22 DIAGNOSIS — Z20.822 SUSPECTED COVID-19 VIRUS INFECTION: Primary | ICD-10-CM

## 2020-04-22 PROCEDURE — 71046 X-RAY EXAM CHEST 2 VIEWS: CPT

## 2020-04-22 PROCEDURE — 99282 EMERGENCY DEPT VISIT SF MDM: CPT

## 2020-04-22 PROCEDURE — 99281 EMR DPT VST MAYX REQ PHY/QHP: CPT

## 2020-04-22 RX ORDER — BENZONATATE 100 MG/1
200 CAPSULE ORAL
Qty: 21 CAP | Refills: 0 | Status: SHIPPED | OUTPATIENT
Start: 2020-04-22 | End: 2020-04-29

## 2020-04-22 RX ORDER — FLUTICASONE PROPIONATE 50 MCG
2 SPRAY, SUSPENSION (ML) NASAL DAILY
Qty: 1 BOTTLE | Refills: 0 | Status: SHIPPED | OUTPATIENT
Start: 2020-04-22

## 2020-04-22 NOTE — LETTER
[address] [place] [address] Dr [facility] EMERGENCY DEPT 
[address] [facility] [address] [phone] Work/School Note Date: 4/22/2020 To Whom It May concern: 
 
Rodrigo Green was seen and treated today in the emergency room by the following provider(s): 
Attending Provider: Alan Kellogg MD 
Physician Assistant: CHAR Serrano. Rodrigo Green may return to work on 5/2/20.  
 
Sincerely, 
 
 
 
 
CHAR Cantu

## 2020-04-22 NOTE — ED PROVIDER NOTES
EMERGENCY DEPARTMENT HISTORY AND PHYSICAL EXAM    3:19 PM      Date: 2020  Patient Name: Florentino Stewart    History of Presenting Illness     Chief Complaint   Patient presents with    Cough         History Provided By: Patient    Additional History (Context): Florentino Stewart is a 51 y.o. male with hx of bradycardai who presents with complaint of generalized fatigue, dry cough, chills, myalgias, sinus pressure, and loss of taste x4 days. Patient denies fever, chest pain, shortness of breath, nausea or vomiting. Notes wife is sick with similar symptoms. Denies known exposure to COVID, recent travel. Has taken Sudafed for symptoms without relief. PCP: Unknown, Provider    Current Outpatient Medications   Medication Sig Dispense Refill    benzonatate (Tessalon Perles) 100 mg capsule Take 2 Caps by mouth three (3) times daily as needed for Cough for up to 7 days. 21 Cap 0    fluticasone propionate (FLONASE) 50 mcg/actuation nasal spray 2 Sprays by Both Nostrils route daily. 1 Bottle 0       Past History     Past Medical History:  Past Medical History:   Diagnosis Date    Bradycardia     History of myocardial perfusion scan 2014    No ischemia or prior infarction. EF 53%. No RWMA.   Borderline positive EKG on pharm stress test.       Past Surgical History:  Past Surgical History:   Procedure Laterality Date    HX HEART CATHETERIZATION  3/7/14       Family History:  Family History   Problem Relation Age of Onset    Heart Attack Father     Heart Surgery Father     Coronary Artery Disease Father        Social History:  Social History     Tobacco Use    Smoking status: Former Smoker     Types: Cigarettes     Last attempt to quit: 2013     Years since quittin.3    Smokeless tobacco: Current User   Substance Use Topics    Alcohol use: No     Comment: Quit 4 months ago    Drug use: Yes     Types: Marijuana       Allergies:  No Known Allergies      Review of Systems       Review of Systems   Constitutional: Positive for chills and fatigue. Negative for fever. HENT: Positive for sinus pressure. Respiratory: Positive for cough. Negative for shortness of breath. Cardiovascular: Negative for chest pain. Gastrointestinal: Negative for abdominal pain, nausea and vomiting. Musculoskeletal: Positive for myalgias. Skin: Negative for rash. Neurological: Negative for weakness. All other systems reviewed and are negative. Physical Exam     Visit Vitals  /82 (BP 1 Location: Left arm, BP Patient Position: Sitting)   Pulse (!) 51 Comment: hx of bradycardia pt states at baseline for heartrate   Temp 97 °F (36.1 °C)   Resp 16   SpO2 100%         Physical Exam  Vitals signs and nursing note reviewed. Constitutional:       General: He is not in acute distress. Appearance: Normal appearance. He is well-developed. He is not ill-appearing or diaphoretic. HENT:      Head: Normocephalic and atraumatic. Right Ear: Tympanic membrane and ear canal normal.      Left Ear: Tympanic membrane and ear canal normal.      Nose: Nose normal.      Mouth/Throat:      Mouth: Mucous membranes are moist.      Pharynx: No oropharyngeal exudate or posterior oropharyngeal erythema. Eyes:      Pupils: Pupils are equal, round, and reactive to light. Neck:      Musculoskeletal: Normal range of motion and neck supple. No neck rigidity. Cardiovascular:      Rate and Rhythm: Normal rate and regular rhythm. Heart sounds: Normal heart sounds. No murmur. No friction rub. No gallop. Pulmonary:      Effort: Pulmonary effort is normal. No respiratory distress. Breath sounds: Normal breath sounds. No wheezing or rales. Musculoskeletal: Normal range of motion. Lymphadenopathy:      Cervical: No cervical adenopathy. Skin:     General: Skin is warm. Findings: No rash. Neurological:      Mental Status: He is alert.            Diagnostic Study Results     Labs -  No results found for this or any previous visit (from the past 12 hour(s)). Radiologic Studies -   XR CHEST PA LAT   Final Result   IMPRESSION:   1. No acute cardiopulmonary process. Medical Decision Making   I am the first provider for this patient. I reviewed the vital signs, available nursing notes, past medical history, past surgical history, family history and social history. Vital Signs-Reviewed the patient's vital signs. Records Reviewed: Nursing Notes and Old Medical Records (Time of Review: 3:19 PM)    ED Course: Progress Notes, Reevaluation, and Consults:  3:19 PM  Reviewed plan with patient, including importance of self-quarantine x 14 days since symptom onset. Discussed need for close outpatient follow-up this week for reassessment. Discussed strict return precautions, including chest pain, shortness of breath, or any other medical concerns. Pt in agreement with plan, ready to go home. Provider Notes (Medical Decision Making): 36-year-old male with history of bradycardia who presents to the ED due to generalized fatigue, dry cough, chills, myalgias, sinus pressure, loss of taste x4 days. Afebrile, nontoxic-appearing, looks well. No evidence of otitis media/externa, strep pharyngitis, pneumonia. No nuchal rigidity or rash. Per CDC guidelines, patient does not meet criteria for COVID-19 testing. Coronavirus precautions reviewed, home isolation precautions provided. Recommendation for self quarantine x14 days since symptom onset. Stable for discharge with symptomatic management and strict return precautions    Diagnosis     Clinical Impression:   1. Suspected Covid-19 Virus Infection    2.  Cough        Disposition: home     Follow-up Information     Follow up With Specialties Details Why [address] [facility]    [facility] EMERGENCY DEPT Emergency Medicine  If symptoms worsen [address] [place] [id number]  [address]  Schedule an appointment as soon as possible for a visit  [address]  [facility] [address]           Discharge Medication List as of 4/22/2020  3:18 PM      START taking these medications    Details   benzonatate (Tessalon Perles) 100 mg capsule Take 2 Caps by mouth three (3) times daily as needed for Cough for up to 7 days. , Print, Disp-21 Cap, R-0      fluticasone propionate (FLONASE) 50 mcg/actuation nasal spray 2 Sprays by Both Nostrils route daily. , Print, Disp-1 Bottle, R-0         STOP taking these medications       acetaminophen-codeine (TYLENOL-CODEINE #3) 300-30 mg per tablet Comments:   Reason for Stopping:         naproxen (NAPROSYN) 375 mg tablet Comments:   Reason for Stopping:               Dictation disclaimer:  Please note that this dictation was completed with [employer], the computer voice recognition software. Quite often unanticipated grammatical, syntax, homophones, and other interpretive errors are inadvertently transcribed by the computer software. Please disregard these errors. Please excuse any errors that have escaped final proofreading.

## 2020-04-22 NOTE — ED TRIAGE NOTES
Pt reports that for the past few days he has been coughing and diarrhea, and fatigue denies fever. Denies any known recent sick contacts. Patient reports that was diagnosed with URI.

## 2020-04-22 NOTE — DISCHARGE INSTRUCTIONS
Patient Education   HOME ISOLATION PRECAUTIONS DURING COVID-19 OUTBREAK (per CDC guidelines)    1) Stay home except to get medical care:  People who are mildly ill with COVID-19 are able to isolate at home during their illness. You should restrict activities outside your home, except for getting medical care. Do not go to work, school, or public areas. Avoid using public transportation, ride-sharing, or taxis. 2) Separate yourself from other people and animals in your home  People: As much as possible, you should stay in a specific room and away from other people in your home. Also, you should use a separate bathroom, if available. 3) Animals: You should restrict contact with pets and other animals while you are sick with COVID-19, just like you would around other people. Although there have not been reports of pets or other animals becoming sick with COVID-19, it is still recommended that people sick with COVID-19 limit contact with animals until more information is known about the virus. When possible, have another member of your household care for your animals while you are sick. If you are sick with COVID-19, avoid contact with your pet, including petting, snuggling, being kissed or licked, and sharing food. If you must care for your pet or be around animals while you are sick, wash your hands before and after you interact with pets and wear a facemask. See COVID-19 and Animals for more information. 4) Call ahead before visiting your doctor  If you have a medical appointment, call the healthcare provider and tell them that you have or may have COVID-19. This will help the healthcare providers office take steps to keep other people from getting infected or exposed. 5) Wear a facemask  You should wear a facemask when you are around other people (e.g., sharing a room or vehicle) or pets and before you enter a healthcare providers office.  If you are not able to wear a facemask (for example, because it causes trouble breathing), then people who live with you should not stay in the same room with you, or they should wear a facemask if they enter your room. 6) Cover your coughs and sneezes  Cover your mouth and nose with a tissue when you cough or sneeze. Throw used tissues in a lined trash can. Immediately wash your hands with soap and water for at least 20 seconds or, if soap and water are not available, clean your hands with an alcohol-based hand  that contains at least 60% alcohol. 7) Clean your hands often  Wash your hands often with soap and water for at least 20 seconds, especially after blowing your nose, coughing, or sneezing; going to the bathroom; and before eating or preparing food. If soap and water are not readily available, use an alcohol-based hand  with at least 60% alcohol, covering all surfaces of your hands and rubbing them together until they feel dry. 8) Soap and water are the best option if hands are visibly dirty. Avoid touching your eyes, nose, and mouth with unwashed hands. 9) Avoid sharing personal household items  You should not share dishes, drinking glasses, cups, eating utensils, towels, or bedding with other people or pets in your home. After using these items, they should be washed thoroughly with soap and water. 10) Clean all high-touch surfaces everyday  High touch surfaces include counters, tabletops, doorknobs, bathroom fixtures, toilets, phones, keyboards, tablets, and bedside tables. Also, clean any surfaces that may have blood, stool, or body fluids on them. Use a household cleaning spray or wipe, according to the label instructions. Labels contain instructions for safe and effective use of the cleaning product including precautions you should take when applying the product, such as wearing gloves and making sure you have good ventilation during use of the product.     11) Monitor your symptoms  Seek prompt medical attention if your illness is worsening (e.g., difficulty breathing). Before seeking care, call your healthcare provider and tell them that you have, or are being evaluated for, COVID-19. Put on a facemask before you enter the facility. These steps will help the healthcare providers office to keep other people in the office or waiting room from getting infected or exposed. Ask your healthcare provider to call the local or state health department. Persons who are placed under active monitoring or facilitated self-monitoring should follow instructions provided by their local health department or occupational health professionals, as appropriate. When working with your local health department check their available hours. 12) If you have a medical emergency and need to call 911, notify the dispatch personnel that you have, or are being evaluated for COVID-19. If possible, put on a facemask before emergency medical services arrive. 13) Discontinuing home isolation  Patients with confirmed COVID-19 should remain under home isolation precautions until the risk of secondary transmission to others is thought to be low. The decision to discontinue home isolation precautions should be made on a case-by-case basis, in consultation with healthcare providers and [facility] and local health departments. Recommended precautions for household members, intimate partners, and caregivers in a nonhealthcare setting of  A patient with symptomatic laboratory-confirmed COVID-19   or   a patient under investigation  Household members, intimate partners, and caregivers in a nonhealthcare setting may have close contact2 with a person with symptomatic, laboratory-confirmed COVID-19 or a person under investigation.  Close contacts should monitor their health; they should call their healthcare provider right away if they develop symptoms suggestive of COVID-19 (e.g., fever, cough, shortness of breath)     Close contacts should also follow these recommendations:   Make sure that you understand and can help the patient follow their healthcare provider's instructions for medication(s) and care. You should help the patient with basic needs in the home and provide support for getting groceries, prescriptions, and other personal needs.  Monitor the patient's symptoms. If the patient is getting sicker, call his or her healthcare provider and tell them that the patient has laboratory-confirmed COVID-19. This will help the healthcare provider's office take steps to keep other people in the office or waiting room from getting infected. Ask the healthcare provider to call the local or [facility] health department for additional guidance. If the patient has a medical emergency and you need to call 911, notify the dispatch personnel that the patient has, or is being evaluated for COVID-19.   Household members should stay in another room or be  from the patient as much as possible. Household members should use a separate bedroom and bathroom, if available.  Prohibit visitors who do not have an essential need to be in the home.  Make sure that shared spaces in the home have good air flow, such as by an air conditioner or an opened window, weather permitting.  Perform hand hygiene frequently. Wash your hands often with soap and water for at least 20 seconds or use an alcohol-based hand  that contains 60 to 95% alcohol, covering all surfaces of your hands and rubbing them together until they feel dry. Soap and water should be used preferentially if hands are visibly dirty.  You and the patient should wear a facemask if you are in the same room.  Wear a disposable facemask and gloves when you touch or have contact with the patient's blood, stool, or body fluids, such as saliva, sputum, nasal mucus, vomit, urine. o Throw out disposable facemasks and gloves after using them. Do not reuse. o When removing personal protective equipment, first remove and dispose of gloves. Then, immediately clean your hands with soap and water or alcohol-based hand . Next, remove and dispose of facemask, and immediately clean your hands again with soap and water or alcohol-based hand . [address] thoroughly.  o Immediately remove and wash clothes or bedding that have blood, stool, or body fluids on them.  o Wear disposable gloves while handling soiled items and keep soiled items away from your body. Clean your hands (with soap and water or an alcohol-based hand ) immediately after removing your gloves. o Read and follow directions on labels of laundry or clothing items and detergent. In general, using a normal laundry detergent according to washing machine instructions and dry thoroughly using the warmest temperatures recommended on the clothing label.  Discuss any additional questions with your state or local health department or healthcare provider. Footnotes  2Close contact is defined as--  a) being within approximately 6 feet (2 meters) of a COVID-19 case for a prolonged period of time; close contact can occur while caring for, living with, visiting, or sharing a health care waiting area or room with a COVID-19 case  - or -  b) having direct contact with infectious secretions of a COVID-19 case (e.g., being coughed on). Learning About Coronavirus [phone])  Coronavirus [phone]): Overview  What is coronavirus (COVID-19)? The coronavirus disease (COVID-19) is caused by a virus. It is an illness that was first found in [place], [place], in December 2019. It has since spread worldwide. The virus can cause fever, cough, and trouble breathing. In severe cases, it can cause pneumonia and make it hard to breathe without help. It can cause death. Coronaviruses are a large group of viruses. They cause the common cold. They also cause more serious illnesses like [place] East respiratory syndrome (MERS) and severe acute respiratory syndrome (SARS). COVID-19 is caused by a novel coronavirus. That means it's a new type that has not been seen in people before. This virus spreads person-to-person through droplets from coughing and sneezing. It can also spread when you are close to someone who is infected. And it can spread when you touch something that has the virus on it, such as a doorknob or a tabletop. What can you do to protect yourself from coronavirus (COVID-19)? The best way to protect yourself from getting sick is to:  · Avoid areas where there is an outbreak. · Avoid contact with people who may be infected. · Wash your hands often with soap or alcohol-based hand sanitizers. · Avoid crowds and try to stay at least 6 feet away from other people. · Wash your hands often, especially after you cough or sneeze. Use soap and water, and scrub for at least 20 seconds. If soap and water aren't available, use an alcohol-based hand . · Avoid touching your mouth, nose, and eyes. What can you do to avoid spreading the virus to others? To help avoid spreading the virus to others:  · Cover your mouth with a tissue when you cough or sneeze. Then throw the tissue in the trash. · Use a disinfectant to clean things that you touch often. · Stay home if you are sick or have been exposed to the virus. Don't go to school, work, or public areas. And don't use public transportation. · If you are sick:  ? Leave your home only if you need to get medical care. But call the doctor's office first so they know you're coming. And wear a face mask, if you have one.  ? If you have a face mask, wear it whenever you're around other people. It can help stop the spread of the virus when you cough or sneeze. ? Clean and disinfect your home every day. Use household  and disinfectant wipes or sprays. Take special care to clean things that you grab with your hands. These include doorknobs, remote controls, phones, and handles on your refrigerator and microwave.  And don't forget countertops, tabletops, bathrooms, and computer keyboards. When to call for help  Call 911 anytime you think you may need emergency care. For example, call if:  · You have severe trouble breathing. (You can't talk at all.)  · You have constant chest pain or pressure. · You are severely dizzy or lightheaded. · You are confused or can't think clearly. · Your face and lips have a blue color. · You pass out (lose consciousness) or are very hard to wake up. Call your doctor now if you develop symptoms such as:  · Shortness of breath. · Fever. · Cough. If you need to get care, call ahead to the doctor's office for instructions before you go. Make sure you wear a face mask, if you have one, to prevent exposing other people to the virus. Where can you get the latest information? The following health organizations are tracking and studying this virus. Their websites contain the most up-to-date information. Jan Terry also learn what to do if you think you may have been exposed to the virus. · U.[place]. Centers for Disease Control and Prevention (CDC): The CDC provides updated news about the disease and travel advice. The website also tells you how to prevent the spread of infection. www.cdc.gov  · World Health Organization [facility]): WHO offers information about the virus outbreaks. WHO also has travel advice. www.who.int  Current as of: April 1, 2020               Content Version: 12.4  © 4974-2148 Healthwise, Incorporated. Care instructions adapted under license by your healthcare professional. If you have questions about a medical condition or this instruction, always ask your healthcare professional. [address] any warranty or liability for your use of this information. Patient Education   Coronavirus ([id number]): Care Instructions  Overview  The coronavirus disease (COVID-19) is caused by a virus. It causes a fever, a cough, and shortness of breath.  It mainly spreads person-to-person through droplets from coughing and sneezing. The virus also can spread when people are in close contact with someone who is infected. Most people have mild symptoms and can take care of themselves at home. If their symptoms get worse, they may need care in a hospital. There is no medicine to fight the virus. It's important to not spread the virus to others. You need to isolate yourself while you are sick. Your doctor will tell you when you no longer need to be isolated. Leave your home only if you need to get medical care. Follow-up care is a key part of your treatment and safety. Be sure to make and go to all appointments, and call your doctor if you are having problems. It's also a good idea to know your test results and keep a list of the medicines you take. How can you care for yourself at home? · Get extra rest. It can help you feel better. · Drink plenty of fluids. This helps replace fluids lost from fever. Fluids also help ease a scratchy throat. Water, soup, fruit juice, and hot tea with lemon are good choices. · Take acetaminophen (such as Tylenol) to reduce a fever. It may also help with muscle aches. Read and follow all instructions on the label. · Sponge your body with lukewarm water to help with fever. Don't use cold water or ice. · Use petroleum jelly on sore skin. This can help if the skin around your nose and lips becomes sore from rubbing a lot with tissues. Tips for isolation  · Wear a face mask, if you have one, when you are around other people. It can help stop the spread of the virus when you cough or sneeze. · Limit contact with people in your home. If possible, stay in a separate bedroom and use a separate bathroom. · Avoid contact with pets and other animals. · Cover your mouth and nose with a tissue when you cough or sneeze. Then throw it in the trash right away. · Wash your hands often, especially after you cough or sneeze.  Use soap and water, and scrub for at least 20 seconds. If soap and water aren't available, use an alcohol-based hand . · Don't share personal household items. These include bedding, towels, cups and glasses, and eating utensils. · Clean and disinfect your home every day. Use household  and disinfectant wipes or sprays. Take special care to clean things that you grab with your hands. These include doorknobs, remote controls, phones, and handles on your refrigerator and microwave. And don't forget countertops, tabletops, bathrooms, and computer keyboards. When should you call for help? Call 911 anytime you think you may need emergency care. For example, call if you have life-threatening symptoms, such as:    · You have severe trouble breathing. (You can't talk at all.)     · You have constant chest pain or pressure.     · You are severely dizzy or lightheaded.     · You are confused or can't think clearly.     · Your face and lips have a blue color.     · You pass out (lose consciousness) or are very hard to wake up.     Call your doctor now or seek immediate medical care if:    · You have moderate trouble breathing. (You can't speak a full sentence.)     · You are coughing up blood (more than about 1 teaspoon).     · You have signs of low blood pressure. These include feeling lightheaded; being too weak to stand; and having cold, pale, clammy skin.    Watch closely for changes in your health, and be sure to contact your doctor if:    · Your symptoms get worse.     · You are not getting better as expected.     Call before you go to the doctor's office. Follow their instructions. And wear a face mask if you have one. Current as of: April 1, 2020               Content Version: 12.4  © 4196-2954 Healthwise, Incorporated.    Care instructions adapted under license by your healthcare professional. If you have questions about a medical condition or this instruction, always ask your healthcare professional. Riya Barraza disclaims any warranty or liability for your use of this information.

## 2020-05-06 ENCOUNTER — APPOINTMENT (OUTPATIENT)
Dept: GENERAL RADIOLOGY | Age: 50
End: 2020-05-06
Attending: EMERGENCY MEDICINE
Payer: COMMERCIAL

## 2020-05-06 ENCOUNTER — HOSPITAL ENCOUNTER (EMERGENCY)
Age: 50
Discharge: HOME | End: 2020-05-06
Attending: EMERGENCY MEDICINE
Payer: COMMERCIAL

## 2020-05-06 VITALS
SYSTOLIC BLOOD PRESSURE: 151 MMHG | HEART RATE: 53 BPM | DIASTOLIC BLOOD PRESSURE: 87 MMHG | BODY MASS INDEX: 26.63 KG/M2 | TEMPERATURE: 98 F | OXYGEN SATURATION: 95 % | WEIGHT: 165 LBS | RESPIRATION RATE: 16 BRPM

## 2020-05-06 DIAGNOSIS — J20.9 ACUTE BRONCHITIS, UNSPECIFIED ORGANISM: Primary | ICD-10-CM

## 2020-05-06 PROCEDURE — 99282 EMERGENCY DEPT VISIT SF MDM: CPT

## 2020-05-06 PROCEDURE — 71045 X-RAY EXAM CHEST 1 VIEW: CPT

## 2020-05-06 RX ORDER — ALBUTEROL SULFATE 90 UG/1
2 AEROSOL, METERED RESPIRATORY (INHALATION)
Qty: 1 INHALER | Refills: 0 | Status: SHIPPED | OUTPATIENT
Start: 2020-05-06

## 2020-05-06 RX ORDER — DOXYCYCLINE 100 MG/1
100 CAPSULE ORAL 2 TIMES DAILY
Qty: 20 CAP | Refills: 0 | Status: SHIPPED | OUTPATIENT
Start: 2020-05-06 | End: 2020-05-16

## 2020-05-06 RX ORDER — AZITHROMYCIN 250 MG/1
TABLET, FILM COATED ORAL
Qty: 6 TAB | Refills: 0 | Status: SHIPPED | OUTPATIENT
Start: 2020-05-06 | End: 2020-05-11

## 2020-05-06 NOTE — DISCHARGE INSTRUCTIONS
Patient Education        Bronchitis: Care Instructions  Your Care Instructions    Bronchitis is inflammation of the bronchial tubes, which carry air to the lungs. The tubes swell and produce mucus, or phlegm. The mucus and inflamed bronchial tubes make you cough. You may have trouble breathing. Most cases of bronchitis are caused by viruses like those that cause colds. Antibiotics usually do not help and they may be harmful. Bronchitis usually develops rapidly and lasts about 2 to 3 weeks in otherwise healthy people. Follow-up care is a key part of your treatment and safety. Be sure to make and go to all appointments, and call your doctor if you are having problems. It's also a good idea to know your test results and keep a list of the medicines you take. How can you care for yourself at home? · Take all medicines exactly as prescribed. Call your doctor if you think you are having a problem with your medicine. · Get some extra rest.  · Take an over-the-counter pain medicine, such as acetaminophen (Tylenol), ibuprofen (Advil, Motrin), or naproxen (Aleve) to reduce fever and relieve body aches. Read and follow all instructions on the label. · Do not take two or more pain medicines at the same time unless the doctor told you to. Many pain medicines have acetaminophen, which is Tylenol. Too much acetaminophen (Tylenol) can be harmful. · Take an over-the-counter cough medicine that contains dextromethorphan to help quiet a dry, hacking cough so that you can sleep. Avoid cough medicines that have more than one active ingredient. Read and follow all instructions on the label. · Breathe moist air from a humidifier, hot shower, or sink filled with hot water. The heat and moisture will thin mucus so you can cough it out. · Do not smoke. Smoking can make bronchitis worse. If you need help quitting, talk to your doctor about stop-smoking programs and medicines.  These can increase your chances of quitting for good.  When should you call for help? Call 911 anytime you think you may need emergency care. For example, call if:    · You have severe trouble breathing.    Call your doctor now or seek immediate medical care if:    · You have new or worse trouble breathing.     · You cough up dark brown or bloody mucus (sputum).     · You have a new or higher fever.     · You have a new rash.    Watch closely for changes in your health, and be sure to contact your doctor if:    · You cough more deeply or more often, especially if you notice more mucus or a change in the color of your mucus.     · You are not getting better as expected. Where can you learn more? Go to http://jignesh-angelique.info/  Enter H333 in the search box to learn more about \"Bronchitis: Care Instructions. \"  Current as of: June 9, 2019Content Version: 12.4  © 1180-4623 Healthwise, Incorporated. Care instructions adapted under license by [employer] (which disclaims liability or warranty for this information). If you have questions about a medical condition or this instruction, always ask your healthcare professional. [address] any warranty or liability for your use of this information.

## 2020-05-06 NOTE — ED PROVIDER NOTES
EMERGENCY DEPARTMENT HISTORY AND PHYSICAL EXAM      Date: 5/6/2020  Patient Name: Kulwant Valdivia    History of Presenting Illness     Chief Complaint   Patient presents with    Cough       History (Context): Kulwant Valdivia is a 51 y.o. gentleman has a long history of smoking, who presents with 14 days of subacute onset, progressive, severe, constant multiple complaints including productive cough, unwell feeling, headache without exacerbating/relieving features or other associated symptoms. The patient does not have sick contacts. On review of systems, the patient denies diarrhea, abdominal pain, chest pain, shortness of breath    PCP: Unknown, Provider    Current Outpatient Medications   Medication Sig Dispense Refill    azithromycin (Zithromax Z-Shane) 250 mg tablet Take 2 tablets on day 1 and 1 tablet/day on days 2 through 5 6 Tab 0    albuterol (PROVENTIL HFA, VENTOLIN HFA, PROAIR HFA) 90 mcg/actuation inhaler Take 2 Puffs by inhalation every four (4) hours as needed for Wheezing. 1 Inhaler 0    inhalational spacing device 1 Each by Does Not Apply route as needed (Cough). 1 Device 0    doxycycline (MONODOX) 100 mg capsule Take 1 Cap by mouth two (2) times a day for 10 days. 20 Cap 0    fluticasone propionate (FLONASE) 50 mcg/actuation nasal spray 2 Sprays by Both Nostrils route daily. 1 Bottle 0       Past History     Past Medical History:  Past Medical History:   Diagnosis Date    Bradycardia     History of myocardial perfusion scan 03/05/2014    No ischemia or prior infarction. EF 53%. No RWMA.   Borderline positive EKG on pharm stress test.       Past Surgical History:  Past Surgical History:   Procedure Laterality Date    HX HEART CATHETERIZATION  3/7/14       Family History:  Family History   Problem Relation Age of Onset    Heart Attack Father     Heart Surgery Father     Coronary Artery Disease Father        Social History:  Social History     Tobacco Use    Smoking status: Current Every Day Smoker     Types: Cigarettes    Smokeless tobacco: Current User   Substance Use Topics    Alcohol use: No     Comment: Quit 4 months ago    Drug use: Yes     Types: Marijuana       Allergies:  No Known Allergies    PMH, PSH, family history, social history, allergies reviewed with the patient with significant items noted above. Review of Systems   As per HPI, otherwise reviewed and negative. Physical Exam     Vitals:    05/06/20 0353   BP: 151/87   Pulse: (!) 53   Resp: 16   Temp: 98 °F (36.7 °C)   SpO2: 95%   Weight: 74.8 kg (165 lb)       Gen: Well-appearing, in no acute distress   HEENT: Normocephalic, sclera anicteric, TMs clear, OP benign  Cardiovascular: Normal rate, regular rhythm, no murmurs, rubs, gallops. Pulses intact and equal distally. Pulmonary: No respiratory distress. No stridor. Wheezing and rhonchi throughout  ABD: Soft, nontender, nondistended. Neuro: Alert. Normal speech. Normal mentation. Psych: Normal thought content and thought processes. : No CVA tenderness  EXT: Moves all extremities well. No cyanosis or clubbing. Skin: Warm and well-perfused. Other:        Diagnostic Study Results     Labs -   No results found for this or any previous visit (from the past 12 hour(s)). Radiologic Studies -   XR CHEST PORT    (Results Pending)     CT Results  (Last 48 hours)    None        CXR Results  (Last 48 hours)    None            Medical Decision Making   I am the first provider for this patient. I reviewed the vital signs, available nursing notes, past medical history, past surgical history, family history and social history. Vital Signs-Reviewed the patient's vital signs. Records Reviewed: Personally, on initial evaluation    MDM:   Patient presents with cough and headache.   Exam significant for wheezing and rhonchi  DDX considered: COPD (new onset), asthma, bronchitis, novel coronavirus, pneumonia  DDX thought to be less likely but also considered due to high risk condition: HIV, lymphoma/leukemia, meningitis. Patient condition on initial evaluation: Mildly ill, stable    Plan:     Orders as below:  Orders Placed This Encounter    XR CHEST PORT    azithromycin (Zithromax Z-Shane) 250 mg tablet    albuterol (PROVENTIL HFA, VENTOLIN HFA, PROAIR HFA) 90 mcg/actuation inhaler    inhalational spacing device    doxycycline (MONODOX) 100 mg capsule        ED Course:   Work-up as above significant for stigmata of acute bronchitis. Patient will be treated empirically. I have told the patient that is possible that he has coronavirus or COPD exacerbation, and he should follow-up with his PCP. Patient demonstrated understanding. Patient condition at time of disposition: Stable  DISCHARGE NOTE:   Pt has been reexamined. Patient has no new complaints, changes, or physical findings. Care plan outlined and precautions discussed. Results were reviewed with the patient. All medications were reviewed with the patient; will d/c home with albuterol, antibiotics. All of pt's questions and concerns were addressed. Alarm symptoms and return precautions associated with chief complaint and evaluation were reviewed with the patient in detail. The patient demonstrated adequate understanding. Patient was instructed and agrees to follow up with PCP, as well as to return to the ED upon further deterioration. Patient is ready to go home.   The patient is happy with this plan    Follow-up Information     Follow up With Specialties Details Why [facility]    [facility] EMERGENCY DEPT Emergency Medicine  As needed, If symptoms worsen [address]    Your primary care physician  Call            Discharge Medication List as of 5/6/2020  4:31 AM      START taking these medications    Details   azithromycin (Zithromax Z-Shane) 250 mg tablet Take 2 tablets on day 1 and 1 tablet/day on days 2 through 5, Print, Disp-6 Tab, R-0      albuterol (PROVENTIL HFA, VENTOLIN HFA, PROAIR HFA) 90 mcg/actuation inhaler Take 2 Puffs by inhalation every four (4) hours as needed for Wheezing., Print, Disp-1 Inhaler, R-0      inhalational spacing device 1 Each by Does Not Apply route as needed (Cough). , Print, Disp-1 Device, R-0      doxycycline (MONODOX) 100 mg capsule Take 1 Cap by mouth two (2) times a day for 10 days. , Print, Disp-20 Cap, R-0         CONTINUE these medications which have NOT CHANGED    Details   fluticasone propionate (FLONASE) 50 mcg/actuation nasal spray 2 Sprays by Both Nostrils route daily. , Print, Disp-1 Bottle, R-0               Disposition: Discharge home    Diagnosis     Clinical Impression:   1. Acute bronchitis, unspecified organism        Signed,  Lucila Gooden MD  Emergency Physician  [facility]    As a voice dictation software was utilized to dictate this note, minor word transpositions can occur. I apologize for confusing wording and typographic errors. Please feel free to contact me for clarification.

## 2020-05-06 NOTE — ED TRIAGE NOTES
Pt states he has had a cough since he was seen on 4/22. Pt states he wanted a covid test at that time and \"I still wants one. \"  Resp even and unlabored.   Pt speaking in complete sentences without difficulty

## 2023-12-29 ENCOUNTER — HOSPITAL ENCOUNTER (INPATIENT)
Facility: HOSPITAL | Age: 53
LOS: 6 days | Discharge: HOME | End: 2024-01-05
Attending: EMERGENCY MEDICINE | Admitting: PSYCHIATRY & NEUROLOGY
Payer: MEDICAID

## 2023-12-29 LAB
ALBUMIN SERPL-MCNC: 4 G/DL (ref 3.4–5)
ALBUMIN/GLOB SERPL: 1.1 (ref 0.8–1.7)
ALP SERPL-CCNC: 66 U/L (ref 45–117)
ALT SERPL-CCNC: 30 U/L (ref 16–61)
AMPHET UR QL SCN: POSITIVE
ANION GAP SERPL CALC-SCNC: 6 MMOL/L (ref 3–18)
AST SERPL-CCNC: 6 U/L (ref 10–38)
BARBITURATES UR QL SCN: NEGATIVE
BASOPHILS # BLD: 0 K/UL (ref 0–0.1)
BASOPHILS NFR BLD: 0 % (ref 0–2)
BENZODIAZ UR QL: NEGATIVE
BILIRUB SERPL-MCNC: 0.3 MG/DL (ref 0.2–1)
BUN SERPL-MCNC: 15 MG/DL (ref 7–18)
BUN/CREAT SERPL: 14 (ref 12–20)
CALCIUM SERPL-MCNC: 9.5 MG/DL (ref 8.5–10.1)
CANNABINOIDS UR QL SCN: NEGATIVE
CHLORIDE SERPL-SCNC: 105 MMOL/L (ref 100–111)
CO2 SERPL-SCNC: 32 MMOL/L (ref 21–32)
COCAINE UR QL SCN: NEGATIVE
CREAT SERPL-MCNC: 1.05 MG/DL (ref 0.6–1.3)
DIFFERENTIAL METHOD BLD: NORMAL
EOSINOPHIL # BLD: 0.2 K/UL (ref 0–0.4)
EOSINOPHIL NFR BLD: 3 % (ref 0–5)
ERYTHROCYTE [DISTWIDTH] IN BLOOD BY AUTOMATED COUNT: 12.2 % (ref 11.6–14.5)
ETHANOL SERPL-MCNC: <3 MG/DL (ref 0–3)
FLUAV RNA SPEC QL NAA+PROBE: NOT DETECTED
FLUBV RNA SPEC QL NAA+PROBE: NOT DETECTED
GLOBULIN SER CALC-MCNC: 3.8 G/DL (ref 2–4)
GLUCOSE SERPL-MCNC: 166 MG/DL (ref 74–99)
HCT VFR BLD AUTO: 43.4 % (ref 36–48)
HGB BLD-MCNC: 14.4 G/DL (ref 13–16)
IMM GRANULOCYTES # BLD AUTO: 0 K/UL (ref 0–0.04)
IMM GRANULOCYTES NFR BLD AUTO: 0 % (ref 0–0.5)
LYMPHOCYTES # BLD: 2.9 K/UL (ref 0.9–3.6)
LYMPHOCYTES NFR BLD: 36 % (ref 21–52)
Lab: ABNORMAL
MCH RBC QN AUTO: 30.6 PG (ref 24–34)
MCHC RBC AUTO-ENTMCNC: 33.2 G/DL (ref 31–37)
MCV RBC AUTO: 92.1 FL (ref 78–100)
METHADONE UR QL: NEGATIVE
MONOCYTES # BLD: 0.3 K/UL (ref 0.05–1.2)
MONOCYTES NFR BLD: 4 % (ref 3–10)
NEUTS SEG # BLD: 4.7 K/UL (ref 1.8–8)
NEUTS SEG NFR BLD: 57 % (ref 40–73)
NRBC # BLD: 0 K/UL (ref 0–0.01)
NRBC BLD-RTO: 0 PER 100 WBC
OPIATES UR QL: NEGATIVE
PCP UR QL: NEGATIVE
PLATELET # BLD AUTO: 310 K/UL (ref 135–420)
PMV BLD AUTO: 9.8 FL (ref 9.2–11.8)
POTASSIUM SERPL-SCNC: 3.7 MMOL/L (ref 3.5–5.5)
PROT SERPL-MCNC: 7.8 G/DL (ref 6.4–8.2)
RBC # BLD AUTO: 4.71 M/UL (ref 4.35–5.65)
SARS-COV-2 RNA RESP QL NAA+PROBE: NOT DETECTED
SODIUM SERPL-SCNC: 143 MMOL/L (ref 136–145)
WBC # BLD AUTO: 8.3 K/UL (ref 4.6–13.2)

## 2023-12-29 PROCEDURE — 80053 COMPREHEN METABOLIC PANEL: CPT

## 2023-12-29 PROCEDURE — 87636 SARSCOV2 & INF A&B AMP PRB: CPT

## 2023-12-29 PROCEDURE — 85025 COMPLETE CBC W/AUTO DIFF WBC: CPT

## 2023-12-29 PROCEDURE — 82077 ASSAY SPEC XCP UR&BREATH IA: CPT

## 2023-12-29 PROCEDURE — 80307 DRUG TEST PRSMV CHEM ANLYZR: CPT

## 2023-12-29 PROCEDURE — 83036 HEMOGLOBIN GLYCOSYLATED A1C: CPT

## 2023-12-29 PROCEDURE — 99285 EMERGENCY DEPT VISIT HI MDM: CPT

## 2023-12-29 ASSESSMENT — LIFESTYLE VARIABLES
HOW OFTEN DO YOU HAVE A DRINK CONTAINING ALCOHOL: 4 OR MORE TIMES A WEEK
HOW MANY STANDARD DRINKS CONTAINING ALCOHOL DO YOU HAVE ON A TYPICAL DAY: 10 OR MORE

## 2023-12-30 PROBLEM — F32.A DEPRESSION: Status: ACTIVE | Noted: 2023-12-30

## 2023-12-30 PROCEDURE — 1240000000 HC EMOTIONAL WELLNESS R&B

## 2023-12-30 RX ORDER — HALOPERIDOL 5 MG/ML
5 INJECTION INTRAMUSCULAR EVERY 6 HOURS PRN
Status: DISCONTINUED | OUTPATIENT
Start: 2023-12-30 | End: 2024-01-05 | Stop reason: HOSPADM

## 2023-12-30 RX ORDER — LORAZEPAM 0.5 MG/1
0.5 TABLET ORAL EVERY 4 HOURS PRN
Status: CANCELLED | OUTPATIENT
Start: 2023-12-30

## 2023-12-30 RX ORDER — HALOPERIDOL 5 MG/1
5 TABLET ORAL EVERY 6 HOURS PRN
Status: DISCONTINUED | OUTPATIENT
Start: 2023-12-30 | End: 2024-01-05 | Stop reason: HOSPADM

## 2023-12-30 RX ORDER — ALBUTEROL SULFATE 90 UG/1
2 AEROSOL, METERED RESPIRATORY (INHALATION) EVERY 4 HOURS PRN
Status: DISCONTINUED | OUTPATIENT
Start: 2023-12-30 | End: 2024-01-05 | Stop reason: HOSPADM

## 2023-12-30 RX ORDER — BENZTROPINE MESYLATE 1 MG/ML
1 INJECTION INTRAMUSCULAR; INTRAVENOUS EVERY 6 HOURS PRN
Status: DISCONTINUED | OUTPATIENT
Start: 2023-12-30 | End: 2024-01-05 | Stop reason: HOSPADM

## 2023-12-30 RX ORDER — TRAZODONE HYDROCHLORIDE 50 MG/1
50 TABLET ORAL NIGHTLY PRN
Status: DISCONTINUED | OUTPATIENT
Start: 2023-12-30 | End: 2023-12-31

## 2023-12-30 RX ORDER — BENZTROPINE MESYLATE 1 MG/1
1 TABLET ORAL EVERY 6 HOURS PRN
Status: DISCONTINUED | OUTPATIENT
Start: 2023-12-30 | End: 2024-01-05 | Stop reason: HOSPADM

## 2023-12-30 RX ORDER — LORAZEPAM 2 MG/ML
2 INJECTION INTRAMUSCULAR EVERY 6 HOURS PRN
Status: DISCONTINUED | OUTPATIENT
Start: 2023-12-30 | End: 2024-01-05 | Stop reason: HOSPADM

## 2023-12-30 RX ORDER — HYDROXYZINE PAMOATE 50 MG/1
50 CAPSULE ORAL 3 TIMES DAILY PRN
Status: DISCONTINUED | OUTPATIENT
Start: 2023-12-30 | End: 2024-01-05 | Stop reason: HOSPADM

## 2023-12-30 ASSESSMENT — SLEEP AND FATIGUE QUESTIONNAIRES
AVERAGE NUMBER OF SLEEP HOURS: 2
DO YOU USE A SLEEP AID: NO
DO YOU HAVE DIFFICULTY SLEEPING: YES

## 2023-12-30 NOTE — ED NOTES
Pt resting comfortably on the stretcher in no apparent distress and changed into paper scrubs. Pt states that he wishes to not wake up.     Friend at bedside.   Denies any further needs at this time.

## 2023-12-30 NOTE — ED NOTES
Pt changed over, placed into álvarez bed.  Pt is calm and compliant.  Belongings placed in locker 2, top shelf.  1 bag, stickers applied and tied off in front of patient.  Security wanded pt.

## 2023-12-30 NOTE — ED NOTES
TRANSFER - OUT REPORT:    Verbal report given to Haley Rodriguez RN on Justice Marcano  being transferred to [facility] Room 111 for routine progression of patient care       Report consisted of patient's Situation, Background, Assessment and   Recommendations(SBAR).     Information from the following report(s) Nurse Handoff Report, ED SBAR, Recent Results, and Event Log was reviewed with the receiving nurse.    [place] Fall Assessment:                           Lines:       Opportunity for questions and clarification was provided.      Patient transported with:  Tech

## 2023-12-30 NOTE — ED TRIAGE NOTES
Pt states he has been on a meth binge for past 6 months.  Stopped using on Christmas day.  Frineds are concerned about withdrawal.  Pt states his only symptoms is tired and anxious.  Friend states he is also having paranoia

## 2023-12-30 NOTE — GROUP NOTE
Art Therapy Group Progress Note    PATIENT SCHEDULED FOR GROUP AT:  12:00 PM     GROUP STOP TIME:  12:45 PM     ATTENDANCE: Low (4/12 participants)    TOPIC / FOCUS:  Hold On vs. Let Go Hands      GOALS: identify current coping skills, identify counterproductive behaviors or habits, promote positive coping skills, encourage self-awareness, practice creativity, encourage focus     PARTICIPATION LEVEL: active listener, interactive, participated in art, contributed to group discussion      PARTICIPATION QUALITY:  appropriate. Attentive, sharing, supportive     ATTENTION LEVEL: focused, invested, moderate energy     LEVEL OF CONSCIOUSNESS: alert, oriented X 3     SPEECH: normal     THOUGHT PROCESS/ CONTENT: logical     AFFECTIVE FUNCTIONING: congruent     MOOD: depressed     SYMBOLIC & THEMATIC CONTENT AS NOTED IN IMAGERY: Pt labeled the let go hand with guilt, cheating, perfectionism, harmful substances, and shame. Pt identified family, creativity, music, health relationships, and God as things to hold onto. Pt began adding different symbols to the negative let go hand including a broken heart, spiral for perfectionism, and a poison bottle spilling. Pt artwork was congruent with Pt presentation.     Pt shared that they had spent so much time focusing on the negative hand that they were worn out when they began to focus on the positives. The Pt shared their experience of feeling shame and quilt because of all the bad and terrible things they had done. This writer educated the Pt on dialects and removing moral judgment from their thinking. This writer also provided psychoeducation on mindfulness and being in the present moment.     The Pt was able to identify coping skills that had been helpful during their moments of sobriety, including attending [employer], being involved in soccer, music, and being with family.     STATUS AFTER INTERVENTION: unchanged     RESPONSE TO LEARNING: able to verbalize current knowledge/

## 2023-12-30 NOTE — PROGRESS NOTES
Patient received on unit at 0945,  via wheelchair, escorted by security, belongings checked by two staff members, pat down search also completed by two staff members, no contraband found on person, valuables  and logged by security, orders released, behavior health assessment completed, patient is alert and oriented x 4,  [facility] notified of patient's admission, will have someone come to consult for H&P,  pt oriented to unit, belongings put away, RN will initiate, develop, implement, review or revise treatment plan as needed.

## 2023-12-30 NOTE — PLAN OF CARE
Problem: Coping  Goal: Pt/Family able to verbalize concerns and demonstrate effective coping strategies  Description: INTERVENTIONS:  1. Assist patient/family to identify coping skills, available support systems and cultural and spiritual values  2. Provide emotional support, including active listening and acknowledgement of concerns of patient and caregivers  3. Reduce environmental stimuli, as able  4. Instruct patient/family in relaxation techniques, as appropriate  5. Assess for spiritual pain/suffering and initiate Spiritual Care, Psychosocial Clinical Specialist consults as needed  Outcome: Progressing     Problem: Decision Making  Goal: Pt/Family able to effectively weigh alternatives and participate in decision making related to treatment and care  Description: INTERVENTIONS:  1. Determine when there are differences between patient's view, family's view, and healthcare provider's view of condition  2. Facilitate patient and family articulation of goals for care  3. Help patient and family identify pros/cons of alternative solutions  4. Provide information as requested by patient/family  5. Respect patient/family right to receive or not to receive information  6. Serve as a liaison between patient and family and health care team  7. Initiate Consults from Ethics, Palliative Care or initiate Family Care Conference as is appropriate  Outcome: Progressing
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
## 2023-12-30 NOTE — CONSULTS
[facility] Family Medicine  FAMILY MEDICINE CONSULT NOTE FOR BEHAVIORAL HEALTH UNIT    Patient:    Justice Marcano , 53 y.o. male   Room/Bed:  111/02  Admission Date:   12/29/2023  Code status:  No Order      Reason for Consult: Medical  Psychiatry Attending Requesting Consult: Dr. Lei    ASSESSMENT:  Justice Marcano is a 53 y.o. male w/ a PMH of methamphetamine abuse, smoking presenting for SI with plan who is now admitted to the [facility] Behavioral Health Unit.    Chaperone during History and Physical: yes     PLAN:    SI with plan  Methamphetamine abuse  - Pt regular amphetamine user with SI on presentation and plan to shoot themselves  - hx meth induced psychosis but none on admission  - inpatient management per psych    Elevated BG  -  on cmp  - fu Hb A1c    H/o bradycardia  - states his bradycardia is a/w dizziness, weakness  - asx at present   - will monitor vitals and sxs  - will order EKG     Current smoker  - most recently, smoking 7-8 cigarettes daily   - declines nicotine patch    Global  Diet: regular  Mobility: ad reese    Thank you for this consult.         SUBJECTIVE:   Dr. Lei has consulted Family Medicine to evaluate Justice Marcano  in the Emergency Department. He is a 53 y.o. male w/ PMH of methamphetamine abuse, bradycardia, tobacco abuse presenting for SI with plan who is now admitted to the [facility] Behavioral Health Unit. States he is no longer having SI. Denies HI. Reports he feels fatigued but he suspects this may be secondary to being bored while admitted. +tobacco 7-8 cig/day, +alcohol (self-described binge drinker but states he has not had any alcohol recently), +meth use, denies other substance use.     ED Course:  UDS amphetamine positive. Glucose elevated to 166 on CMP. Otherwise unremarkable.    Point of contact: Ksenia Marcano [phone]        CURRENT MEDICATIONS:  No current facility-administered medications for this encounter.       ROS (positive findings are in BOLD; negative    Transportation Needs: No Transportation Needs (12/30/2023)    PRAPARE - Transportation     Lack of Transportation (Medical): No     Lack of Transportation (Non-Medical): No   Housing Stability: High Risk (12/30/2023)    Housing Stability Vital Sign     Unable to Pay for Housing in the Last Year: Yes     Number of Places Lived in the Last Year: 1     Unstable Housing in the Last Year: No       No Known Allergies        OBJECTIVE:   VITALS  Patient Vitals for the past 24 hrs:   Temp Pulse Resp BP SpO2   12/30/23 1017 97.2 °F (36.2 °C) 63 16 113/71 98 %   12/30/23 0701 98.8 °F (37.1 °C) 56 16 125/74 98 %   12/29/23 2105 98.4 °F (36.9 °C) 59 17 139/75 97 %       PHYSICAL EXAM  General: in no apparent distress.  HEENT: NCAT, PERRLA, EOM intact; oral mucosa well perfused, oropharynx clear  Neck: supple, normal ROM  CVS: regular rate and rhythm, S1, S2 normal, no murmur, click, rub or gallop  Lungs: chest clear, no wheezing, rales, normal symmetric air entry    Abdomen: Soft, non-distended, non-TTP, no organomegaly, no masses  Ext: No calf tenderness, peripheral pulses present, no significant edema.  Skin: warm, dry, intact, no significant rashes/petechia/ecchymosis appreciated  Neuro: No focal neurologic deficits or gross abnormalities  Psych: A&Ox3, appropriate mood and affect    LABS, IMAGING, AND DIAGNOSTIC STUDIES  Recent Results (from the past 24 hour(s))   CBC with Auto Differential    Collection Time: 12/29/23  9:15 PM   Result Value Ref Range    WBC 8.3 4.6 - 13.2 K/uL    RBC 4.71 4.35 - 5.65 M/uL    Hemoglobin 14.4 13.0 - 16.0 g/dL    Hematocrit 43.4 36.0 - 48.0 %    MCV 92.1 78.0 - 100.0 FL    MCH 30.6 24.0 - 34.0 PG    MCHC 33.2 31.0 - 37.0 g/dL    RDW 12.2 11.6 - 14.5 %    Platelets 310 135 - 420 K/uL    MPV 9.8 9.2 - 11.8 FL    Nucleated RBCs 0.0 0  WBC    nRBC 0.00 0.00 - 0.01 K/uL    Neutrophils % 57 40 - 73 %    Lymphocytes % 36 21 - 52 %    Monocytes % 4 3 - 10 %    Eosinophils % 3 0 - 5 %

## 2023-12-31 LAB
EST. AVERAGE GLUCOSE BLD GHB EST-MCNC: 100 MG/DL
HBA1C MFR BLD: 5.1 % (ref 4.2–5.6)

## 2023-12-31 PROCEDURE — 1240000000 HC EMOTIONAL WELLNESS R&B

## 2023-12-31 PROCEDURE — 6370000000 HC RX 637 (ALT 250 FOR IP): Performed by: PSYCHIATRY & NEUROLOGY

## 2023-12-31 PROCEDURE — 99223 1ST HOSP IP/OBS HIGH 75: CPT | Performed by: PSYCHIATRY & NEUROLOGY

## 2023-12-31 RX ORDER — QUETIAPINE FUMARATE 25 MG/1
50 TABLET, FILM COATED ORAL NIGHTLY
Status: DISCONTINUED | OUTPATIENT
Start: 2023-12-31 | End: 2024-01-05 | Stop reason: HOSPADM

## 2023-12-31 RX ADMIN — QUETIAPINE FUMARATE 50 MG: 25 TABLET ORAL at 20:18

## 2023-12-31 NOTE — H&P
suicide?  Patient denies    Social History     Living Situation: Lives at home with wife and 5 kids    Employment: Employed as a [employer]    Education: High school graduate      Relationships/Children:  with 5 kids    Legal: Patient denies    Spirituality/Alevism:     Vitals/Labs      Vitals:    12/30/23 0701 12/30/23 1017 12/30/23 1945 12/31/23 0758   BP: 125/74 113/71 114/79 101/66   Pulse: 56 63 63 59   Resp: 16 16 17 17   Temp: 98.8 °F (37.1 °C) 97.2 °F (36.2 °C) 98.1 °F (36.7 °C) 97.8 °F (36.6 °C)   TempSrc: Oral Oral Oral Oral   SpO2: 98% 98% 97% 97%   Weight:       Height:           Labs:   Results for orders placed or performed during the hospital encounter of 12/29/23   COVID-19 & Influenza Combo    Specimen: Nasopharyngeal   Result Value Ref Range    SARS-CoV-2, PCR Not detected NOTD      Rapid Influenza A By PCR Not detected NOTD      Rapid Influenza B By PCR Not detected NOTD     CBC with Auto Differential   Result Value Ref Range    WBC 8.3 4.6 - 13.2 K/uL    RBC 4.71 4.35 - 5.65 M/uL    Hemoglobin 14.4 13.0 - 16.0 g/dL    Hematocrit 43.4 36.0 - 48.0 %    MCV 92.1 78.0 - 100.0 FL    MCH 30.6 24.0 - 34.0 PG    MCHC 33.2 31.0 - 37.0 g/dL    RDW 12.2 11.6 - 14.5 %    Platelets 310 135 - 420 K/uL    MPV 9.8 9.2 - 11.8 FL    Nucleated RBCs 0.0 0  WBC    nRBC 0.00 0.00 - 0.01 K/uL    Neutrophils % 57 40 - 73 %    Lymphocytes % 36 21 - 52 %    Monocytes % 4 3 - 10 %    Eosinophils % 3 0 - 5 %    Basophils % 0 0 - 2 %    Immature Granulocytes 0 0.0 - 0.5 %    Neutrophils Absolute 4.7 1.8 - 8.0 K/UL    Lymphocytes Absolute 2.9 0.9 - 3.6 K/UL    Monocytes Absolute 0.3 0.05 - 1.2 K/UL    Eosinophils Absolute 0.2 0.0 - 0.4 K/UL    Basophils Absolute 0.0 0.0 - 0.1 K/UL    Absolute Immature Granulocyte 0.0 0.00 - 0.04 K/UL    Differential Type AUTOMATED     CMP   Result Value Ref Range    Sodium 143 136 - 145 mmol/L    Potassium 3.7 3.5 - 5.5 mmol/L    Chloride 105 100 - 111 mmol/L    CO2 32 21 - 32  methamphetamine use    Level of impairment/disability: Moderate to severe    Justice Marcano is a 53 y.o. who is currently requiring acute stabilization after expressing depression, psychosis and passive suicidal thoughts in the context of regular methamphetamine use.     Admit to locked inpatient behavioral health unit. Start milieu, group, art and occupation therapy.  With mutual agreement, patient to start low-dose Seroquel at 50 mg daily night to help with mood symptoms as well as psychosis and suicidal thoughts.  Potential side effects including weight gain and tardive dyskinesia were discussed with the patient.  He was given the opportunity to ask any questions.  He verbalized understanding and agreed with this plan.  Routine labs ordered and reviewed by this provider.  Reviewed instructions, risks, benefits and side effects.   Start disposition planning; verify upcoming outpatient appointments with therapist and/or psychiatric medication prescriber.   Tentative date of discharge: 3-5 days                      Behavioral Services  Medicare Certification Upon Admission    I certify that this patient's inpatient psychiatric hospital admission is medically necessary for:       [x] Treatment which could reasonably be expected to improve this patient's condition,        [] For diagnostic study;      AND      [x] The inpatient psychiatric services are provided while the individual is under the care of a physician and are included in the individualized plan of care.     Estimated length of stay/service is  5 days     Plan for post-hospital care will include medical and psychiatric follow up.       CHRIS WADE MD  Psychiatrist  [facility]

## 2023-12-31 NOTE — PLAN OF CARE
Problem: Anxiety  Goal: Will report anxiety at manageable levels  Description: INTERVENTIONS:  1. Administer medication as ordered  2. Teach and rehearse alternative coping skills  3. Provide emotional support with 1:1 interaction with staff  Outcome: Progressing     Problem: Behavior  Goal: Pt/Family maintain appropriate behavior and adhere to behavioral management agreement, if implemented  Description: INTERVENTIONS:  1. Assess patient/family's coping skills and  non-compliant behavior (including use of illegal substances)  2. Notify security of behavior or suspected illegal substances which indicate the need for search of the family and/or belongings  3. Encourage verbalization of thoughts and concerns in a socially appropriate manner  4. Utilize positive, consistent limit setting strategies supporting safety of patient, staff and others  5. Encourage participation in the decision making process about the behavioral management agreement  6. If a visitor's behavior poses a threat to safety call refer to organization policy.  7. Initiate consult with , Psychosocial CNS, Spiritual Care as appropriate  Outcome: Progressing     Problem: Depression/Self Harm  Goal: Effect of psychiatric condition will be minimized and patient will be protected from self harm  Description: INTERVENTIONS:  1. Assess impact of patient's symptoms on level of functioning, self care needs and offer support as indicated  2. Assess patient/family knowledge of depression, impact on illness and need for teaching  3. Provide emotional support, presence and reassurance  4. Assess for possible suicidal thoughts or ideation. If patient expresses suicidal thoughts or statements do not leave alone, initiate Suicide Precautions, move to a room close to the nursing station and obtain sitter  5. Initiate consults as appropriate with Mental Health Professional, Spiritual Care, Psychosocial CNS, and consider a recommendation to the LIP for

## 2023-12-31 NOTE — PROGRESS NOTES
Pt. Is visible in the milieu. He is pleasant and compliant. He offers no complaint. He is free from falls, self harm or harming others.

## 2024-01-01 PROCEDURE — 99232 SBSQ HOSP IP/OBS MODERATE 35: CPT | Performed by: PSYCHIATRY & NEUROLOGY

## 2024-01-01 PROCEDURE — 6370000000 HC RX 637 (ALT 250 FOR IP): Performed by: PSYCHIATRY & NEUROLOGY

## 2024-01-01 PROCEDURE — 1240000000 HC EMOTIONAL WELLNESS R&B

## 2024-01-01 RX ADMIN — QUETIAPINE FUMARATE 50 MG: 25 TABLET ORAL at 19:45

## 2024-01-01 NOTE — PLAN OF CARE
Problem: Anxiety  Goal: Will report anxiety at manageable levels  Description: INTERVENTIONS:  1. Administer medication as ordered  2. Teach and rehearse alternative coping skills  3. Provide emotional support with 1:1 interaction with staff  Outcome: Progressing     Problem: Coping  Goal: Pt/Family able to verbalize concerns and demonstrate effective coping strategies  Description: INTERVENTIONS:  1. Assist patient/family to identify coping skills, available support systems and cultural and spiritual values  2. Provide emotional support, including active listening and acknowledgement of concerns of patient and caregivers  3. Reduce environmental stimuli, as able  4. Instruct patient/family in relaxation techniques, as appropriate  5. Assess for spiritual pain/suffering and initiate Spiritual Care, Psychosocial Clinical Specialist consults as needed  Outcome: Progressing     Problem: Decision Making  Goal: Pt/Family able to effectively weigh alternatives and participate in decision making related to treatment and care  Description: INTERVENTIONS:  1. Determine when there are differences between patient's view, family's view, and healthcare provider's view of condition  2. Facilitate patient and family articulation of goals for care  3. Help patient and family identify pros/cons of alternative solutions  4. Provide information as requested by patient/family  5. Respect patient/family right to receive or not to receive information  6. Serve as a liaison between patient and family and health care team  7. Initiate Consults from Ethics, Palliative Care or initiate Family Care Conference as is appropriate  Outcome: Progressing     Problem: Behavior  Goal: Pt/Family maintain appropriate behavior and adhere to behavioral management agreement, if implemented  Description: INTERVENTIONS:  1. Assess patient/family's coping skills and  non-compliant behavior (including use of illegal substances)  2. Notify security of

## 2024-01-01 NOTE — PLAN OF CARE
Problem: Coping  Goal: Pt/Family able to verbalize concerns and demonstrate effective coping strategies  Description: INTERVENTIONS:  1. Assist patient/family to identify coping skills, available support systems and cultural and spiritual values  2. Provide emotional support, including active listening and acknowledgement of concerns of patient and caregivers  3. Reduce environmental stimuli, as able  4. Instruct patient/family in relaxation techniques, as appropriate  5. Assess for spiritual pain/suffering and initiate Spiritual Care, Psychosocial Clinical Specialist consults as needed  1/1/2024 1657 by Haley Rodriguez RN  Outcome: Progressing  1/1/2024 0257 by Paula Trujillo RN  Outcome: Progressing     Problem: Anxiety  Goal: Will report anxiety at manageable levels  Description: INTERVENTIONS:  1. Administer medication as ordered  2. Teach and rehearse alternative coping skills  3. Provide emotional support with 1:1 interaction with staff  1/1/2024 1657 by Haley Rodriguez RN  Outcome: Progressing  1/1/2024 0257 by Paula Trujillo RN  Outcome: Progressing       Patient received this am alert and verbal, no c/o pain, took po meds this am, isolative to his room, affect is flat and blunt, denies SI, HI and AVH, will continue to monitor and encourage interaction on unit, patient is compliant with treatment.

## 2024-01-01 NOTE — PROGRESS NOTES
[facility]  Inpatient Progress Note     Date of Service: 01/01/24  Hospital Day: 2     Subjective/Interval History   01/01/24    Treatment Team Notes:  Notes reviewed and/or discussed and report that Justice Marcano is a 53 y.o. White (non-) male with a history of recent and ongoing methamphetamine use, presented and admitted for mood symptoms including psychosis which in his own words, is paranoia.         Patient interview: Justice Marcano was interviewed by this writer today.  Patient was seen in his room and reported feeling about the same as yesterday although not worse.  He reported feeling a little irritable earlier in the day as he was not sure why his family could not visit him on the unit.  He reported sleeping all right at night and did not appear to be responding to internal stimuli.  He was appropriate during the session.      Objective     Vitals:    01/01/24 0815   BP: 104/65   Pulse: 82   Resp: 16   Temp: 98.6 °F (37 °C)   SpO2:        No results found for this or any previous visit (from the past 24 hour(s)).    Mental Status Examination     Appearance/Hygiene 53 y.o. White (non-) male  Hygiene: In hospital scrubs   Behavior/Social Relatedness Appropriate   Musculoskeletal Gait/Station: appropriate  Tone (flaccid, cogwheeling, spastic): not assessed  Psychomotor (hyperkinetic, hypokinetic): calm   Involuntary movements (tics, dyskinesias, akathisa, stereotypies): none   Speech   Rate, rhythm, volume, fluency and articulation are appropriate   Mood   A little irritable   Affect    Blunted   Thought Process Linear and goal directed   Thought Content and Perceptual Disturbances Denies self-injurious behavior (SIB), suicidal ideation (SI), aggressive behavior or homicidal ideation (HI)    Denies auditory and visual hallucinations   Sensorium and Cognition  Grossly intact   Insight  Fair to poor   Judgment Fair to poor        Assessment/Plan      Psychiatric

## 2024-01-02 PROCEDURE — 99232 SBSQ HOSP IP/OBS MODERATE 35: CPT | Performed by: PSYCHIATRY & NEUROLOGY

## 2024-01-02 PROCEDURE — 6370000000 HC RX 637 (ALT 250 FOR IP): Performed by: PSYCHIATRY & NEUROLOGY

## 2024-01-02 PROCEDURE — 93005 ELECTROCARDIOGRAM TRACING: CPT | Performed by: PSYCHIATRY & NEUROLOGY

## 2024-01-02 PROCEDURE — 1240000000 HC EMOTIONAL WELLNESS R&B

## 2024-01-02 RX ORDER — NALTREXONE HYDROCHLORIDE 50 MG/1
50 TABLET, FILM COATED ORAL
Status: DISCONTINUED | OUTPATIENT
Start: 2024-01-03 | End: 2024-01-05 | Stop reason: HOSPADM

## 2024-01-02 RX ORDER — CITALOPRAM HYDROBROMIDE 10 MG/1
10 TABLET ORAL DAILY
Status: DISCONTINUED | OUTPATIENT
Start: 2024-01-02 | End: 2024-01-05

## 2024-01-02 RX ADMIN — QUETIAPINE FUMARATE 50 MG: 25 TABLET ORAL at 20:00

## 2024-01-02 NOTE — PROGRESS NOTES
SOCIAL WORK GROUP THERAPY PROGRESS NOTE    Group Time:  10:15am    Group Topic:  Coping Skills    Group Participation:     Pt expressed not interested in attending session despite staff support.

## 2024-01-02 NOTE — PLAN OF CARE
Problem: Anxiety  Goal: Will report anxiety at manageable levels  Description: INTERVENTIONS:  1. Administer medication as ordered  2. Teach and rehearse alternative coping skills  3. Provide emotional support with 1:1 interaction with staff  1/2/2024 1255 by Kayode Lama RN  Outcome: Progressing  1/2/2024 0625 by Alma Felix RN  Outcome: Progressing     Problem: Coping  Goal: Pt/Family able to verbalize concerns and demonstrate effective coping strategies  Description: INTERVENTIONS:  1. Assist patient/family to identify coping skills, available support systems and cultural and spiritual values  2. Provide emotional support, including active listening and acknowledgement of concerns of patient and caregivers  3. Reduce environmental stimuli, as able  4. Instruct patient/family in relaxation techniques, as appropriate  5. Assess for spiritual pain/suffering and initiate Spiritual Care, Psychosocial Clinical Specialist consults as needed  1/2/2024 1255 by Kayode Lama RN  Outcome: Progressing  1/2/2024 0625 by Alma Felix RN  Outcome: Progressing     Problem: Decision Making  Goal: Pt/Family able to effectively weigh alternatives and participate in decision making related to treatment and care  Description: INTERVENTIONS:  1. Determine when there are differences between patient's view, family's view, and healthcare provider's view of condition  2. Facilitate patient and family articulation of goals for care  3. Help patient and family identify pros/cons of alternative solutions  4. Provide information as requested by patient/family  5. Respect patient/family right to receive or not to receive information  6. Serve as a liaison between patient and family and health care team  7. Initiate Consults from Ethics, Palliative Care or initiate Family Care Conference as is appropriate  1/2/2024 0625 by Alma Felix RN  Outcome: Progressing     Problem: Behavior  Goal: Pt/Family maintain appropriate

## 2024-01-02 NOTE — GROUP NOTE
Art Therapy Group Progress Note    PATIENT SCHEDULED FOR GROUP AT:  1:00 PM     GROUP STOP TIME:  1:45 PM     ATTENDANCE: Moderate (6/11 participants)    TOPIC / FOCUS: Spectrum of emotion and congruent coping     GOALS:  emotion identification, increasing mindfulness of emotion experience, identifying current coping skills, increasing knowledge of appropriate coping skills, creating coping skill plan, encouraging self-awareness, promoting creativity and self-expression    PARTICIPATION LEVEL:   active listener, interactive, participated in art, contributed to group discussion    PARTICIPATION QUALITY:  appropriate, attentive, sharing    ATTENTION LEVEL: focused, invested, moderate energy     LEVEL OF CONSCIOUSNESS: oriented, alert X 3     SPEECH: normal     THOUGHT PROCESS/ CONTENT: logical, goal-directed     AFFECTIVE FUNCTIONING: congruent     MOOD: dysphoric, anxious     SYMBOLIC & THEMATIC CONTENT AS NOTED IN IMAGERY: Pt focused on samson and how they tend to have a lingering fear of something happening which snowballs out of control. The PT depicted this by creating an image of a mandy day on the beach which progressed to a large storm with big, dark, black, clouds. Pt stated their days often turn lousy quick. Pt noted they know what to do - call their sponsor, talk to someone who is willing to listen, go to meetings, attend [employer], go for a run, or walk.     However, Pt noted they make excuses or turn to unhealthy decisions which make things worse. Pt also noted they tend to run away from negative things, even if they continue to follow. Pt was encouraged to think about things they want to have in their life. Pt responded positively, thinking of what they would run to, such as samson.     Pt was encouraged to practice taking pause, using grounding skills, and allow space for decision making to reduce reactive and destructive behaviors.     STATUS AFTER INTERVENTION: unchanged     RESPONSE TO LEARNING: able to

## 2024-01-02 NOTE — PROGRESS NOTES
[facility]  Inpatient Progress Note     Date of Service: 01/02/24  Hospital Day: 3     Subjective/Interval History   01/02/24    Treatment Team Notes:  Notes reviewed and/or discussed and report that Justice Marcano is a patient recently mated to the facility, with a history of depression and polysubstance use disorder, mostly methamphetamines use disorder present, admitted to the facility not only because of his being depressed, but also because the presence of delusional thoughts, and paranoia.      Patient interview: Justice Marcano was interviewed by this writer today.  During today's session, the patient described his history of polysubstance use disorder, specifically the use of alcohol, cocaine, most recently methamphetamines, and the difficulties that he has had with his family as a result.  He described a history of abstinence that lasted for 8 months when he was very active on his treatment including he is going to his [employer], to be part of a music group wit members of his [employer], and his being able to maintain sobriety poor.  Of a month or so.  However he described spending increta of time doing so, which angered his wife, so she pressuring him to stay at home and not to continue with this type of activities.  He described as a result, he is beginning to use drugs again and the specifically amphetamines, however he has also drunk on occasion.  Treatment with Antabuse in the past, created he is becoming rather psychotic he says psychotic symptoms lasting for months.  This is mention since which suggested the possibility of providing treatment for his alcoholism however I am planning on using naltrexone as treatment with Antabuse is contraindicated.  Treatment with Campral will be another possibility.      Objective     Vitals:    01/02/24 0745   BP: 113/71   Pulse: 63   Resp: 17   Temp: 98.1 °F (36.7 °C)   SpO2: 100%     Vitals are stable    Recent Results (from the past 24

## 2024-01-02 NOTE — GROUP NOTE
Group Therapy Note    Date: 1/2/2024    Group Start Time: 1400  Group End Time: 1445  Group Topic: Psychoeducation        Jayleen Hernadez        Group Therapy Note    Attendees: 4      To create a recovery plan and to identify coping strategies to assist patients with meeting their goals.           Status After Intervention:  Improved    Participation Level: Interactive    Participation Quality: Appropriate, Attentive, and Sharing      Speech:  normal      Thought Process/Content: Logical  Linear      Affective Functioning: Congruent      Mood: euthymic      Level of consciousness:  Alert, Oriented x4, and Attentive      Response to Learning: Able to verbalize current knowledge/experience, Able to verbalize/acknowledge new learning, Able to retain information, and Capable of insight      Endings: None Reported    Modes of Intervention: Education, Support, and Socialization      Discipline Responsible: /Counselor      Signature:  Jayleen Hernadez

## 2024-01-02 NOTE — PLAN OF CARE
Problem: Anxiety  Goal: Will report anxiety at manageable levels  Description: INTERVENTIONS:  1. Administer medication as ordered  2. Teach and rehearse alternative coping skills  3. Provide emotional support with 1:1 interaction with staff  1/2/2024 0625 by Alma Felix RN  Outcome: Progressing  1/1/2024 1657 by Haley Rodriguez RN  Outcome: Progressing     Problem: Coping  Goal: Pt/Family able to verbalize concerns and demonstrate effective coping strategies  Description: INTERVENTIONS:  1. Assist patient/family to identify coping skills, available support systems and cultural and spiritual values  2. Provide emotional support, including active listening and acknowledgement of concerns of patient and caregivers  3. Reduce environmental stimuli, as able  4. Instruct patient/family in relaxation techniques, as appropriate  5. Assess for spiritual pain/suffering and initiate Spiritual Care, Psychosocial Clinical Specialist consults as needed  1/2/2024 0625 by Alma Felix RN  Outcome: Progressing  1/1/2024 1657 by Haley Rodriguez RN  Outcome: Progressing     Problem: Decision Making  Goal: Pt/Family able to effectively weigh alternatives and participate in decision making related to treatment and care  Description: INTERVENTIONS:  1. Determine when there are differences between patient's view, family's view, and healthcare provider's view of condition  2. Facilitate patient and family articulation of goals for care  3. Help patient and family identify pros/cons of alternative solutions  4. Provide information as requested by patient/family  5. Respect patient/family right to receive or not to receive information  6. Serve as a liaison between patient and family and health care team  7. Initiate Consults from Ethics, Palliative Care or initiate Family Care Conference as is appropriate  Outcome: Progressing     Problem: Behavior  Goal: Pt/Family maintain appropriate behavior and adhere to behavioral management  agreement, if implemented  Description: INTERVENTIONS:  1. Assess patient/family's coping skills and  non-compliant behavior (including use of illegal substances)  2. Notify security of behavior or suspected illegal substances which indicate the need for search of the family and/or belongings  3. Encourage verbalization of thoughts and concerns in a socially appropriate manner  4. Utilize positive, consistent limit setting strategies supporting safety of patient, staff and others  5. Encourage participation in the decision making process about the behavioral management agreement  6. If a visitor's behavior poses a threat to safety call refer to organization policy.  7. Initiate consult with , Psychosocial CNS, Spiritual Care as appropriate  Outcome: Progressing     Problem: Depression/Self Harm  Goal: Effect of psychiatric condition will be minimized and patient will be protected from self harm  Description: INTERVENTIONS:  1. Assess impact of patient's symptoms on level of functioning, self care needs and offer support as indicated  2. Assess patient/family knowledge of depression, impact on illness and need for teaching  3. Provide emotional support, presence and reassurance  4. Assess for possible suicidal thoughts or ideation. If patient expresses suicidal thoughts or statements do not leave alone, initiate Suicide Precautions, move to a room close to the nursing station and obtain sitter  5. Initiate consults as appropriate with Mental Health Professional, Spiritual Care, Psychosocial CNS, and consider a recommendation to the LIP for a Psychiatric Consultation  Outcome: Progressing     Pt received in dayroom, alert and oriented x4, pt is cooperative and interacts well with peers. Denies anxiety and depression. Pt denies any SI/HI and denies any hallucinations. Pt is eating, toileting and sleeping well; pt is compliant with medications. Stressed the importance of medication compliance and positive

## 2024-01-02 NOTE — GROUP NOTE
Group Therapy Note    Date: 1/2/2024    Group Start Time: 1500  Group End Time: 1545  Group Topic: Music Therapy      Tricia Garrison        Group Therapy Note    Attendees: 5/12    Group Focus: Music therapy group consisted of collaborative music making with a combination of singing and instrumental improvisation. Group members selected songs from a song list which were played together live with instruments. The group may promote improved mood, energy, sense of self-efficacy, present-centered focus, and use of music as a coping skill.         Notes:  Patient engaged in instrument playing with a moderately high amount of focus and investment in a manner coordinated with others, potentially indicating present-centered focus and openness to use of music making to promote behavioral activation. Patient asked to keep lyrics from songs utilized in this group. Pt discussed lyrics in group and shared that he played songs today he had never played before.    Status After Intervention:  Unchanged    Participation Level: Interactive    Participation Quality: Appropriate, Attentive, Sharing, and Supportive      Speech:  normal      Thought Process/Content: Logical      Affective Functioning: Congruent      Mood: euthymic      Level of consciousness:  Alert and Attentive      Response to Learning: Able to verbalize current knowledge/experience, Able to verbalize/acknowledge new learning, Able to retain information, and Capable of insight      Endings: None Reported    Modes of Intervention: Support, Socialization, Exploration, and Media      Discipline Responsible: /Counselor      Signature:  ERLINDA Middleton, LPC  Board-Certified Music Therapist  Licensed Professional Counselor

## 2024-01-02 NOTE — GROUP NOTE
Group Therapy Note    Date: 1/2/2024    Group Start Time: 0930  Group End Time: 1020  Group Topic: Music Therapy      Tricia Garrison        Group Therapy Note    Attendees: 5/12    Group Focus: Music therapy group consisted of a mindfulness-based music listening exercise. Therapist provided psychoeducation on mindfulness and how to utilize music as a form of mindfulness. Group members listened, wrote, and discussed what they noticed in five songs from differing genres. Group members discussed instruments and other musical elements, emotions in the music, thoughts, associations with music, memories, and body sensations. The group may promote self-awareness and use of music and mindfulness as coping skills.       Notes:  Patient engaged in group through written exercise and sharing thoughts, emotions, and associations related to the music. Pt discussed how the types of music people listen to may keep them in a state of negativity and how it is important to be mindful of one's music and other forms of media that a person is consuming as it could affect a person's mental health.    Status After Intervention:  Unchanged    Participation Level: Interactive    Participation Quality: Appropriate, Attentive, and Sharing      Speech:  normal      Thought Process/Content: Logical      Affective Functioning: Congruent      Mood: \"mixed emotions,\" \"happy and sad\"      Level of consciousness:  Alert and Attentive      Response to Learning: Able to verbalize current knowledge/experience, Able to verbalize/acknowledge new learning, Able to retain information, and Capable of insight      Endings: None Reported    Modes of Intervention: Education, Support, Socialization, Exploration, and Media      Discipline Responsible: /Counselor      Signature:  ERLINDA Middleton, LPC  Board-Certified Music Therapist  Licensed Professional Counselor

## 2024-01-02 NOTE — PROGRESS NOTES
Psychosocial Assessment     Admission Reason: Mood shifts, paranoid, substance abuse.    C-SSRS Screening Completed - Current Suicide Risk:  [] No Risk  [x] Low [] Moderate [] High     Risk Factors: feeling overwhelmed & with lack of support, especially family. Can become anxious & overwhelmed.Overdose attempt in 2000.    Protective Factors: [facility], couples counseling with spouse. Pt Honest & asking for help.    After consideration of C-SSRS screening results, C-SSRS assessments, and this professional's assessment the patient's overall suicide risk assessed to be:  [x] Low   [] Moderate   [] High     [x] Discussed current suicide risk, protective and risk factors with treatment team to determine safety interventions as applicable.     Gender:  [x] Male [] Female [] Transgender  [] Other    Sexual Orientation:  [] Heterosexual [] Homosexual [x] Bisexual [] Other    Homicidal Ideation:  [] Past [] Present [x] Denies     Onset and Duration of Problem:SI minimal & only seems to occur on occasion when stressed by family & self medicates with drugs or alcohol, as gets more paranoid.    Current or Past Mental Health and/or Addictions Treatment (and response to treatment):  [x] Yes, When and Where: [facility] x1 yr ago  [x] No  mental health    Substance Use/Alcohol Use/Addiction (document name of substance, age of onset, how much and how often, route of use and date of last use):  alcohol.. x3 days ago,( drank daily until x6 months ago ), cocaine.. couple weeks ago ( $50 ), Meth... daily ( $100 / wk ) over past x6 months.. last use this Lexis am,   [x] Reports [] Denies     History of Biomedical Complications Associated with Substance Use/Abuse:  [] Reports [x] Denies  Specify:    Family History of Mental Illness or Substance Use/Abuse:  [] Yes (Specify)  [x] No    Trauma and Abuse History:  [] Reports [x] Denies  Specify:      Legal History:  []  Yes (Specify)  [x] No

## 2024-01-02 NOTE — BH NOTE
Patient appeared to have slept for 7 hours throughout the night with no issues , will continue to monitor.

## 2024-01-02 NOTE — BSMART NOTE
Crisis note:    Patient's wife Sheri Marcano called asking to have the doctor called her at   [phone]. Message for Dr. Lei at nursing station.

## 2024-01-03 LAB
EKG ATRIAL RATE: 50 BPM
EKG DIAGNOSIS: NORMAL
EKG P AXIS: 22 DEGREES
EKG P-R INTERVAL: 146 MS
EKG Q-T INTERVAL: 430 MS
EKG QRS DURATION: 98 MS
EKG QTC CALCULATION (BAZETT): 392 MS
EKG R AXIS: 74 DEGREES
EKG T AXIS: 66 DEGREES
EKG VENTRICULAR RATE: 50 BPM

## 2024-01-03 PROCEDURE — 99231 SBSQ HOSP IP/OBS SF/LOW 25: CPT | Performed by: PSYCHIATRY & NEUROLOGY

## 2024-01-03 PROCEDURE — 6370000000 HC RX 637 (ALT 250 FOR IP): Performed by: PSYCHIATRY & NEUROLOGY

## 2024-01-03 PROCEDURE — 1240000000 HC EMOTIONAL WELLNESS R&B

## 2024-01-03 PROCEDURE — 93010 ELECTROCARDIOGRAM REPORT: CPT | Performed by: INTERNAL MEDICINE

## 2024-01-03 RX ADMIN — CITALOPRAM HYDROBROMIDE 10 MG: 10 TABLET ORAL at 08:00

## 2024-01-03 RX ADMIN — NALTREXONE HYDROCHLORIDE 50 MG: 50 TABLET, FILM COATED ORAL at 08:00

## 2024-01-03 RX ADMIN — QUETIAPINE FUMARATE 50 MG: 25 TABLET ORAL at 21:17

## 2024-01-03 NOTE — GROUP NOTE
Group Therapy Note    Date: 1/3/2024    Group Start Time: 1500  Group End Time: 1600  Group Topic: Emma Daily        Group Therapy Note    Attendees: 8/16     Group Focus: Recreational therapy group engaged patients through a group game. RT placed patients into two teams to encourage team building. RT used topics that focused on emotions, coping skills, and other mental health trivia questions. This group can assist in increasing positive mood, coping strategies, emotional awareness, social and problem-solving skills, and reduce stress.               Notes:  Patient shared he enjoys music as a coping skill.  Patient was able to identify and discuss emotions. Patient displayed positive team building skills collaborating with team members about answers. Patient expressed a reason of him need treatment is due to him internalizing his emotions.     Status After Intervention:  Unchanged    Participation Level: Active Listener and Interactive    Participation Quality: Appropriate, Attentive, and Sharing      Speech:  normal      Thought Process/Content: Logical      Affective Functioning: Congruent      Mood: euthymic      Level of consciousness:  Alert and Attentive      Response to Learning: Able to verbalize current knowledge/experience      Endings: None Reported    Modes of Intervention: Socialization, Problem-solving, and Activity      Recreational Therapist   Emma Paris

## 2024-01-03 NOTE — PLAN OF CARE
Problem: Behavior  Goal: Pt/Family maintain appropriate behavior and adhere to behavioral management agreement, if implemented  Description: INTERVENTIONS:  1. Assess patient/family's coping skills and  non-compliant behavior (including use of illegal substances)  2. Notify security of behavior or suspected illegal substances which indicate the need for search of the family and/or belongings  3. Encourage verbalization of thoughts and concerns in a socially appropriate manner  4. Utilize positive, consistent limit setting strategies supporting safety of patient, staff and others  5. Encourage participation in the decision making process about the behavioral management agreement  6. If a visitor's behavior poses a threat to safety call refer to organization policy.  7. Initiate consult with , Psychosocial CNS, Spiritual Care as appropriate  Outcome: Progressing     Problem: Depression/Self Harm  Goal: Effect of psychiatric condition will be minimized and patient will be protected from self harm  Description: INTERVENTIONS:  1. Assess impact of patient's symptoms on level of functioning, self care needs and offer support as indicated  2. Assess patient/family knowledge of depression, impact on illness and need for teaching  3. Provide emotional support, presence and reassurance  4. Assess for possible suicidal thoughts or ideation. If patient expresses suicidal thoughts or statements do not leave alone, initiate Suicide Precautions, move to a room close to the nursing station and obtain sitter  5. Initiate consults as appropriate with Mental Health Professional, Spiritual Care, Psychosocial CNS, and consider a recommendation to the [facility] for a Psychiatric Consultation  1/2/2024 2139 by Kleber Song, RN  Outcome: Not Progressing  1/2/2024 1255 by Kayode Lama, RN  Outcome: Progressing     Problem: Drug Abuse/Detox  Goal: Will have no detox symptoms and will verbalize plan for changing drug-related  behavior  Description: INTERVENTIONS:  1. Administer medication as ordered  2. Monitor physical status  3. Provide emotional support with 1:1 interaction with staff  4. Encourage  recovery focused treatment   Outcome: Not Progressing

## 2024-01-03 NOTE — PROGRESS NOTES
[facility] [place]  Inpatient Progress Note     Date of Service: 01/03/24  Hospital Day: 4     Subjective/Interval History   01/03/24    Treatment Team Notes:  Notes reviewed and/or discussed and report that Justice Marcano is a patient with a history of depression and polysubstance use disorder mostly methamphetamines lately, attention invited to the dictated admission note which is self-explanatory.      Patient interview: Justice Marcano was interviewed by this writer today.  Treatment with citalopram 10 mg daily, has been started the same as naltrexone 50 mg every morning.  In addition, we maintain treatment with Seroquel 50 mg at bedtime which the patient is tolerating well.  During session today, the need to treatment participation especially participation after discharge in a 12-step intensive outpatient treatment program is strongly suggested.  However, he notes that his depression needs to be treated for him to be able to do well on his substance use disorder.  For now we will maintain the same.      Objective     Vitals:    01/03/24 0800   BP: 118/70   Pulse: 86   Resp: 18   Temp: 98.1 °F (36.7 °C)   SpO2: 100%     Vitals are normal.    Recent Results (from the past 24 hour(s))   EKG 12 Lead    Collection Time: 01/02/24  1:04 PM   Result Value Ref Range    Ventricular Rate 50 BPM    Atrial Rate 50 BPM    P-R Interval 146 ms    QRS Duration 98 ms    Q-T Interval 430 ms    QTc Calculation (Bazett) 392 ms    P Axis 22 degrees    R Axis 74 degrees    T Axis 66 degrees    Diagnosis       Sinus bradycardia  Minimal voltage criteria for LVH, may be normal variant ( Sokolow-Arechiga )  Borderline ECG  When compared with ECG of 15-FRANCK-2017 10:06,  Criteria for Septal infarct are no longer present  Confirmed by MD Bryson, Franck ([id number]) on 1/3/2024 8:07:06 AM       Sinus bradycardia noted above.  Otherwise normal QT and QTc intervals.  m    Appearance/Hygiene 53 y.o. White (non-) male  Hygiene: Fair

## 2024-01-03 NOTE — BH NOTE
2041- pt has been visible in day area.  Very pleasant upon interaction.  Remains compliant with meds.  Education provided on Seroquel and side effects.  Denied si/hi/avh during time of assessment.  Pt was provided with an opportunity for daily reflection during 1:1.  All needs assessed and met.  Will continue to monitor and support as needed.

## 2024-01-03 NOTE — GROUP NOTE
Art Therapy Group Progress Note    PATIENT SCHEDULED FOR GROUP AT:  1:00 PM     GROUP STOP TIME:  1:45 PM     ATTENDANCE: High (10/16 participants)    TOPIC / FOCUS: Celebrate the Small Things     GOALS: define accomplishment and celebration, identify small victories, list joyful experiences, increase positive thinking, increase self-esteem, practice gratitude, increase resiliency, increase experience of samson, promote creativity, encourage grounding techniques, practice mindfulness        PARTICIPATION LEVEL:   active listener, interactive, participated in art, contributed to group discussion     PARTICIPATION QUALITY:  appropriate, attentive, sharing, supportive      ATTENTION LEVEL: focused, invested, moderate energy      LEVEL OF CONSCIOUSNESS: oriented, alert X 3      SPEECH: normal      THOUGHT PROCESS/ CONTENT: logical     AFFECTIVE FUNCTIONING: congruent      MOOD: euthymic       SYMBOLIC & THEMATIC CONTENT AS NOTED IN IMAGERY: Pt artwork was congruent with Pt's positive and smiling presentation. The Pts artwork was big and colorful. Pt reported that their greatest samson was music, that they loved the power of it. They appreciate that music can make you cry, it can lift you up.     STATUS AFTER INTERVENTION: unchanged      RESPONSE TO LEARNING: able to verbalize current knowledge/ experience, able to retain information, capable of insight     ENDINGS: none reported     MODES OF INTERVENTION: exploration, art media, socialization, psychoeducation      DISCIPLINE RESPONSIBLE: Art Therapist      Jose Miguel Forbes  Art Therapist, MA

## 2024-01-03 NOTE — PROGRESS NOTES
Pt.' Case was discussed in staffing. Pt is making progress. Pt is still on withdrawal protocol. Pt.'s medications are being adjusted. Pt has been participating n groups on the unit. Ketan OBRIEN met with pt to discuss dc planning. Pt expressed to feeling okay. Ketan OBRIEN discussed safety plan, continued relapse prevention, and aftercare.pt stated he has been successful in the past with attending AA meetings. Pt plans to attend AA/NA meetings in addition to going to an IOP program. CAMILLE informed pt about various  IOP programs. Pt plans to return home with spouse and children, attend NA.AA meetings, follow up with family counseling with spouse at [facility]. Covering CAMILLE discussed benefits of individual counseling. Pt.'s mood and insight are starting to improve. Pt denies ideations and hallucinations. Pt will continue to be provided support towards dc planning. Pt will be referred to [facility] for IOP.             Tanya Oviedo MA, LMHP-R

## 2024-01-03 NOTE — PLAN OF CARE
Problem: Anxiety  Goal: Will report anxiety at manageable levels  Description: INTERVENTIONS:  1. Administer medication as ordered  2. Teach and rehearse alternative coping skills  3. Provide emotional support with 1:1 interaction with staff  Outcome: Progressing     Problem: Coping  Goal: Pt/Family able to verbalize concerns and demonstrate effective coping strategies  Description: INTERVENTIONS:  1. Assist patient/family to identify coping skills, available support systems and cultural and spiritual values  2. Provide emotional support, including active listening and acknowledgement of concerns of patient and caregivers  3. Reduce environmental stimuli, as able  4. Instruct patient/family in relaxation techniques, as appropriate  5. Assess for spiritual pain/suffering and initiate Spiritual Care, Psychosocial Clinical Specialist consults as needed  Outcome: Progressing     Problem: Behavior  Goal: Pt/Family maintain appropriate behavior and adhere to behavioral management agreement, if implemented  Description: INTERVENTIONS:  1. Assess patient/family's coping skills and  non-compliant behavior (including use of illegal substances)  2. Notify security of behavior or suspected illegal substances which indicate the need for search of the family and/or belongings  3. Encourage verbalization of thoughts and concerns in a socially appropriate manner  4. Utilize positive, consistent limit setting strategies supporting safety of patient, staff and others  5. Encourage participation in the decision making process about the behavioral management agreement  6. If a visitor's behavior poses a threat to safety call refer to organization policy.  7. Initiate consult with , Psychosocial CNS, Spiritual Care as appropriate  1/3/2024 0900 by Ranjana Diaz, RN  Outcome: Progressing  1/2/2024 2139 by Kleber Song, RN  Outcome: Progressing   PT demonstrates a stable mood, neutral affect. PT is less isolative today.

## 2024-01-04 PROCEDURE — 1240000000 HC EMOTIONAL WELLNESS R&B

## 2024-01-04 PROCEDURE — 6370000000 HC RX 637 (ALT 250 FOR IP): Performed by: PSYCHIATRY & NEUROLOGY

## 2024-01-04 PROCEDURE — 99232 SBSQ HOSP IP/OBS MODERATE 35: CPT | Performed by: PSYCHIATRY & NEUROLOGY

## 2024-01-04 RX ADMIN — CITALOPRAM HYDROBROMIDE 10 MG: 10 TABLET ORAL at 07:51

## 2024-01-04 RX ADMIN — NALTREXONE HYDROCHLORIDE 50 MG: 50 TABLET, FILM COATED ORAL at 07:51

## 2024-01-04 RX ADMIN — QUETIAPINE FUMARATE 50 MG: 25 TABLET ORAL at 21:13

## 2024-01-04 NOTE — GROUP NOTE
Group Therapy Note    Date: 1/4/2024    Group Start Time: 0500  Group End Time: 0545  Group Topic: Nursing    [facility] TRTMT 1    Ranjana Diaz RN; Whit Damon RN      Discharge Planning    Group Therapy Note    Attendees: 4       Patient's Goal:  to complete a safety plan    Notes:  PT participated well    Status After Intervention:  Improved    Participation Level: Active Listener and Interactive    Participation Quality: Appropriate and Attentive      Speech:  normal      Thought Process/Content: Logical  Linear      Affective Functioning: Congruent      Mood: elevated      Level of consciousness:  Alert, Oriented x4, and Attentive      Response to Learning: Able to verbalize/acknowledge new learning      Endings: None Reported    Modes of Intervention: Education and Activity      Discipline Responsible: Registered Nurse      Signature:  Ranjana Diaz RN

## 2024-01-04 NOTE — BSMART NOTE
SOCIAL WORK GROUP THERAPY  NOTE           Date: January 4, 2024     Group Time:  12:00 PM      Group Ended 12:45      [facility] Special Unit 1     Group Topic: Hope     Patient Refused to Participate in Group session today but was encouraged to Contribute.      Group members Participation: 6    Group Session: Hope is a positive emotion, and a belief. It brings peace because there is trust. Going through the season of pain, which is not forever, and samson can be experience again in your life. Hope is what we as humans need to have to  live life to its fullest.      Adriana Dias, , MSW. Supervisee

## 2024-01-04 NOTE — GROUP NOTE
Group Therapy Note    Date: 1/4/2024    Group Start Time: 1430  Group End Time: 1500  Group Topic: Psychoeducation        Jayleen Hernadez        Group Therapy Note    Attendees: 6  Group Topic- Discussing healthy physical and mental health strategies to implement in a balanced lifestyle.        Status After Intervention:  Unchanged    Participation Level: Active Listener and Interactive    Participation Quality: Appropriate, Attentive, and Sharing      Speech:  normal      Thought Process/Content: Logical      Affective Functioning: Congruent      Mood: euthymic      Level of consciousness:  Alert, Oriented x4, and Attentive      Response to Learning: Able to verbalize current knowledge/experience, Able to verbalize/acknowledge new learning, and Able to retain information      Endings: None Reported    Modes of Intervention: Education and Support      Discipline Responsible: /Counselor      Signature:  Jayleen Hernadez

## 2024-01-04 NOTE — BH NOTE
2108- pt has been in day area conversing with peers appropriately, watching tv and conversing on phone appropriately.  Denied si/hi/avh during time of assessment.  Educated on meds.  He reported positive effects of Seroquel.  All needs assessed and met.  Will continue to monitor and support as needed.

## 2024-01-04 NOTE — PLAN OF CARE
Problem: Anxiety  Goal: Will report anxiety at manageable levels  Description: INTERVENTIONS:  1. Administer medication as ordered  2. Teach and rehearse alternative coping skills  3. Provide emotional support with 1:1 interaction with staff  Outcome: Progressing     Problem: Behavior  Goal: Pt/Family maintain appropriate behavior and adhere to behavioral management agreement, if implemented  Description: INTERVENTIONS:  1. Assess patient/family's coping skills and  non-compliant behavior (including use of illegal substances)  2. Notify security of behavior or suspected illegal substances which indicate the need for search of the family and/or belongings  3. Encourage verbalization of thoughts and concerns in a socially appropriate manner  4. Utilize positive, consistent limit setting strategies supporting safety of patient, staff and others  5. Encourage participation in the decision making process about the behavioral management agreement  6. If a visitor's behavior poses a threat to safety call refer to organization policy.  7. Initiate consult with , Psychosocial CNS, Spiritual Care as appropriate  Outcome: Progressing     Problem: Drug Abuse/Detox  Goal: Will have no detox symptoms and will verbalize plan for changing drug-related behavior  Description: INTERVENTIONS:  1. Administer medication as ordered  2. Monitor physical status  3. Provide emotional support with 1:1 interaction with staff  4. Encourage  recovery focused treatment   Outcome: Progressing   PT demonstrates a stable neutral mood, brightened affect. PT is isolative to room at times, selectively attends groups. PT is medication compliant, denies WD S/S. CIWA score 0. PT denies SI/HI/AV/AH at this time. 15 min safety rounds maintained. Emotional support provided.

## 2024-01-04 NOTE — PROGRESS NOTES
SW Contact:      Pt Contact: today's emphasis was review of what working a structured   \"Recovery\" program looks like. Pt admits he was following one but allowed spouses mood shifts to have a negative impact on him, with him changing focus on being upset with spouse, instead of using positive resources in his support network.    Also continued education of CBT principles with emphasis on identifying cognitive distortions.    Dr & tx team updated

## 2024-01-04 NOTE — PROGRESS NOTES
[facility] [place]  Inpatient Progress Note     Date of Service: 01/04/24  Hospital Day: 5     Subjective/Interval History   01/04/24    Treatment Team Notes:  Notes reviewed and/or discussed and report that Justice Marcano is a patient with a history of depression and polysubstance use disorder mostly methamphetamines attention invited to the dictated admission note which is self-explanatory.      Patient interview: Justice Marcano was interviewed by this writer today.  The patient continues to participate very appropriately, he is taking his medications as prescribed and he is denying any current medications related side effects.  Depression is improving, he is currently denying any psychotic-like symptoms, and is agreeable to intensive outpatient rehab program.  He indicated that he needs to go back to work as he is the only source of support for his family.  Will discuss with inpatient .  Possible discharge tomorrow is a possibility if treatment continues to provide the patient with the improvement that he requires.      Objective     Vitals:    01/04/24 0805   BP: 123/80   Pulse: 52   Resp: 18   Temp: 96.9 °F (36.1 °C)   SpO2: 99%     Vitals are stable, bradycardia is the usual for him.    No results found for this or any previous visit (from the past 24 hour(s)).    Mental Status Examination     Appearance/Hygiene 53 y.o. White (non-) male  Hygiene: Fair   Behavior/Social Relatedness Appropriate   Musculoskeletal Gait/Station: appropriate  Tone (flaccid, cogwheeling, spastic): not assessed  Psychomotor (hyperkinetic, hypokinetic): calm   Involuntary movements (tics, dyskinesias, akathisa, stereotypies): none   Speech   Rate, rhythm, volume, fluency and articulation are appropriate   Mood   Depression is much improved   Affect    Congruent   Thought Process Linear and goal directed   Thought Content and Perceptual Disturbances Denies self-injurious behavior (SIB), suicidal

## 2024-01-04 NOTE — GROUP NOTE
Group Therapy Note    Date: 1/4/2024    Group Start Time: 1500  Group End Time: 1600  Group Topic: Recreational        Emma Paris        Group Therapy Note    Attendees: 8/16      Group Type: Act It, Draw It, Describe It      Group Focus: Recreational therapy group involved group members identifying feelings and coping skills through acting, drawing, or descriptions. Patients discussed how to positively react to negative emoitions and cope with stressful life events. This group may enhance coping strategies, emotional awareness, mood, and decrease stress and anxiety.               Notes:  Patient actively engaged and shared in group. Patient expressed his love for music and using it as a coping skill. Patient shared using the coping strategy \"take a break\"  when feeling overwhelmed or frustrated at work. Patient provided encouragement to other group members.     Status After Intervention:  Unchanged    Participation Level: Active Listener and Interactive    Participation Quality: Appropriate, Attentive, Sharing, and Supportive      Speech:  normal      Thought Process/Content: Logical      Affective Functioning: Congruent      Mood: euthymic      Level of consciousness:  Alert and Attentive      Response to Learning: Able to verbalize current knowledge/experience      Endings: None Reported    Modes of Intervention: Socialization, Problem-solving, and Movement       Recreational Therapist   Emma Paris

## 2024-01-04 NOTE — GROUP NOTE
Group Therapy Note    Date: 1/4/2024    Group Start Time: 0930  Group End Time: 1030  Group Topic: Music Therapy      Tricia Garrison        Group Therapy Note    Attendees: 8/16    Group Focus: Music therapy group consisted of psychoeducation on the creation of mood regulation playlists. The group created a playlist moving from the group's undesired mood state of tired/exhausted to their desired mood state of energetic/happy/upbeat. Group members assigned a Likert scale of 1-5 for the emotional content of the music, lyrics, and overall song to then order the playlist. The group may promote use of music as a tool to help regulate mood.       Notes:  Pt reported feeling \"lazy and irritated\" at the start of group. Pt engaged in group through written exercise and sharing Likert scale rankings related to mood state of music and lyrics. Pt explained the concept of mood regulation playlists to a peer in group, demonstrating understanding of the concept. Pt also discussed his use of music and music platforms for music listening. Based on his participation, pt seemed open to exploring use of music as a tool to regulate mood.      Status After Intervention:  Improved    Participation Level: Interactive    Participation Quality: Appropriate, Attentive, Sharing, and Supportive      Speech:  normal      Thought Process/Content: Logical      Affective Functioning: Congruent      Mood: \"lazy and irritated\" and euthymic      Level of consciousness:  Alert and Attentive      Response to Learning: Able to verbalize current knowledge/experience, Able to verbalize/acknowledge new learning, Able to retain information, and Capable of insight      Endings: None Reported    Modes of Intervention: Education, Support, Socialization, Exploration, Clarifying, and Media      Discipline Responsible: /Counselor      Signature:  ERLINDA Middleton,  LPC  Board-Certified Music Therapist  Licensed Professional Counselor

## 2024-01-05 VITALS
HEIGHT: 66 IN | TEMPERATURE: 97.1 F | RESPIRATION RATE: 20 BRPM | HEART RATE: 57 BPM | WEIGHT: 135 LBS | DIASTOLIC BLOOD PRESSURE: 70 MMHG | OXYGEN SATURATION: 100 % | SYSTOLIC BLOOD PRESSURE: 124 MMHG | BODY MASS INDEX: 21.69 KG/M2

## 2024-01-05 PROBLEM — F33.3 SEVERE RECURRENT MAJOR DEPRESSION WITH PSYCHOTIC FEATURES (HCC): Status: ACTIVE | Noted: 2024-01-05

## 2024-01-05 PROBLEM — F32.A DEPRESSION: Status: RESOLVED | Noted: 2023-12-30 | Resolved: 2024-01-05

## 2024-01-05 PROCEDURE — 6370000000 HC RX 637 (ALT 250 FOR IP): Performed by: PSYCHIATRY & NEUROLOGY

## 2024-01-05 RX ORDER — QUETIAPINE FUMARATE 50 MG/1
50 TABLET, FILM COATED ORAL NIGHTLY
Qty: 30 TABLET | Refills: 0 | Status: SHIPPED | OUTPATIENT
Start: 2024-01-05

## 2024-01-05 RX ORDER — CITALOPRAM HYDROBROMIDE 10 MG/1
20 TABLET ORAL DAILY
Status: DISCONTINUED | OUTPATIENT
Start: 2024-01-05 | End: 2024-01-05 | Stop reason: HOSPADM

## 2024-01-05 RX ORDER — CITALOPRAM 20 MG/1
20 TABLET ORAL DAILY
Qty: 30 TABLET | Refills: 0 | Status: SHIPPED | OUTPATIENT
Start: 2024-01-05

## 2024-01-05 RX ORDER — NALTREXONE HYDROCHLORIDE 50 MG/1
50 TABLET, FILM COATED ORAL
Qty: 30 TABLET | Refills: 0 | Status: SHIPPED | OUTPATIENT
Start: 2024-01-05

## 2024-01-05 RX ADMIN — CITALOPRAM HYDROBROMIDE 20 MG: 10 TABLET ORAL at 08:39

## 2024-01-05 RX ADMIN — NALTREXONE HYDROCHLORIDE 50 MG: 50 TABLET, FILM COATED ORAL at 08:39

## 2024-01-05 NOTE — DISCHARGE INSTRUCTIONS
BEHAVIORAL HEALTH NURSING DISCHARGE NOTE      The following personal items collected during your admission are returned to you:   Dental Appliance:    Vision:    Hearing Aid:    Jewelry:    Clothing:    Other Valuables:    Valuables sent to safe:        PATIENT INSTRUCTIONS:       The discharge information has been reviewed with the  patient .  The  patient  verbalized understanding.        Patient {ARMBANDS:[id number]}  BEHAVIORAL HEALTH NURSING DISCHARGE NOTE      The following personal items collected during your admission are returned to you:   Dental Appliance:    Vision:    Hearing Aid:    Jewelry:    Clothing:    Other Valuables:    Valuables sent to safe:        PATIENT INSTRUCTIONS:      Fo      The discharge information has been reviewed with patient The  patient  verbalized understanding.        Patient armband removed.

## 2024-01-05 NOTE — PLAN OF CARE
Problem: Anxiety  Goal: Will report anxiety at manageable levels  Description: INTERVENTIONS:  1. Administer medication as ordered  2. Teach and rehearse alternative coping skills  3. Provide emotional support with 1:1 interaction with staff  Outcome: Progressing     Problem: Coping  Goal: Pt/Family able to verbalize concerns and demonstrate effective coping strategies  Description: INTERVENTIONS:  1. Assist patient/family to identify coping skills, available support systems and cultural and spiritual values  2. Provide emotional support, including active listening and acknowledgement of concerns of patient and caregivers  3. Reduce environmental stimuli, as able  4. Instruct patient/family in relaxation techniques, as appropriate  5. Assess for spiritual pain/suffering and initiate Spiritual Care, Psychosocial Clinical Specialist consults as needed  Outcome: Progressing     Problem: Decision Making  Goal: Pt/Family able to effectively weigh alternatives and participate in decision making related to treatment and care  Description: INTERVENTIONS:  1. Determine when there are differences between patient's view, family's view, and healthcare provider's view of condition  2. Facilitate patient and family articulation of goals for care  3. Help patient and family identify pros/cons of alternative solutions  4. Provide information as requested by patient/family  5. Respect patient/family right to receive or not to receive information  6. Serve as a liaison between patient and family and health care team  7. Initiate Consults from Ethics, Palliative Care or initiate Family Care Conference as is appropriate  Outcome: Progressing     Problem: Behavior  Goal: Pt/Family maintain appropriate behavior and adhere to behavioral management agreement, if implemented  Description: INTERVENTIONS:  1. Assess patient/family's coping skills and  non-compliant behavior (including use of illegal substances)  2. Notify security of  behavior or suspected illegal substances which indicate the need for search of the family and/or belongings  3. Encourage verbalization of thoughts and concerns in a socially appropriate manner  4. Utilize positive, consistent limit setting strategies supporting safety of patient, staff and others  5. Encourage participation in the decision making process about the behavioral management agreement  6. If a visitor's behavior poses a threat to safety call refer to organization policy.  7. Initiate consult with , Psychosocial CNS, Spiritual Care as appropriate  Outcome: Progressing     Problem: Depression/Self Harm  Goal: Effect of psychiatric condition will be minimized and patient will be protected from self harm  Description: INTERVENTIONS:  1. Assess impact of patient's symptoms on level of functioning, self care needs and offer support as indicated  2. Assess patient/family knowledge of depression, impact on illness and need for teaching  3. Provide emotional support, presence and reassurance  4. Assess for possible suicidal thoughts or ideation. If patient expresses suicidal thoughts or statements do not leave alone, initiate Suicide Precautions, move to a room close to the nursing station and obtain sitter  5. Initiate consults as appropriate with Mental Health Professional, Spiritual Care, Psychosocial CNS, and consider a recommendation to the [facility] for a Psychiatric Consultation  Outcome: Progressing     Problem: Drug Abuse/Detox  Goal: Will have no detox symptoms and will verbalize plan for changing drug-related behavior  Description: INTERVENTIONS:  1. Administer medication as ordered  2. Monitor physical status  3. Provide emotional support with 1:1 interaction with staff  4. Encourage  recovery focused treatment   Outcome: Progressing       Pt received in dayroom, alert and oriented x4, pt is cooperative and interacts well with peers. Denies anxiety and depression. Pt denies any SI/HI and denies

## 2024-01-05 NOTE — GROUP NOTE
Group Therapy Note    Date: 1/5/2024    Group Start Time: 0930  Group End Time: 1020  Group Topic: Music Therapy      Tricia Garrison        Group Therapy Note    Attendees: 10/16    Group Focus: Music therapy group explored the themes of overcoming obstacles and hope for the day ahead utilizing the interventions of elias analysis and a collaborative group songwriting exercise. Group members worked together to re-write lyrics to a well-known song while processing the lyrics they were contributing. The group's newly created song was then sung back with instruments. Some group members also wrote their own individual song lyrics which were shared as well. The group may promote self-expression, socialization, self-efficacy, improve mood, and promote use of music as a coping skill.         Notes:  Pt reported feeling \"good\" at the start of group. Pt wrote and shared individual song lyrics and contributed to the group's songwriting. Pt wrote song lyrics related to his wife. Pt also wrote the lyrics, \"It's gonna be a nice, productive day.\" Pt discussed how his day would be productive by describing his plans for follow-up care after discharge. Pt engaged in music making with a moderately high amount of creativity, investment, and focus in a manner coordinated with others. At the end of group, pt expressed gratitude for the groups and discussed his future plans related to making music when he leaves this hospital.    Status After Intervention:  Unchanged    Participation Level: Interactive    Participation Quality: Appropriate, Attentive, Sharing, and Supportive      Speech:  normal      Thought Process/Content: Logical      Affective Functioning: Congruent      Mood: euthymic      Level of consciousness:  Alert and Attentive      Response to Learning: Able to verbalize current knowledge/experience, Able to verbalize/acknowledge new learning, Able to retain

## 2024-01-05 NOTE — PROGRESS NOTES
SW Contact:      Pt Contact: Although some normal anxiety prior to D/C, pt was able to address several positive aspects of his tx here. He was especially able to gain insight into the correlation of his mood disorder, triggers for depression / anxiety to get worse as well as his returning to self medicating with alcohol this past year.    He agreed to comply with d/c and Safety plan to include:    New outpt tx plan will be with:  [address], [facility]  #[phone]  Medications: Dr Lei  1/24/24 at 3:15pm    Therapist: PARMINDER Henderson LCSW ( attend [facility] Tuesday & Wednesday 4:30 pm to 6 pm )  Pt to start on 1/9/24 at 4:30pm    [facility]:   Couples with Spouse  Wife schedules this.    AA & NA Meetings      & tx team updated

## 2024-01-05 NOTE — PROGRESS NOTES
Patient received discharge instructions, verbalized understanding of follow up appt. All personal items given to pt.

## 2024-01-10 NOTE — DISCHARGE SUMMARY
[facility]  PSYCH DISCHARGE    Name:  SANDIE RAMIREZ  MR#:   [id number]  :  1970  ACCOUNT #:  [id number]  ADMIT DATE:  2023  DISCHARGE DATE:  2024    Admission date to the behavioral unit was 2023, discharge 2024.    SIGNIFICANT FINDINGS:  History and physical exam was performed shortly after the patient was admitted to the facility by Dr. Torres, covering for the undersigned that weekend, at which time Dr. Torres described the patient having a history of depression and polysubstance use disorder, most recently methamphetamine use.  He presented himself to the emergency department describing his being not only depressed but also being increasingly paranoid.  During the patient's evaluation, he described the use of methamphetamines, although in the past, other drugs have been also utilized regularly by him.  Regardless, describing being suicidal and having had an access to a gun prior to admission, the case was presented to the physician on-call who admitted the patient later on to my service.    Shortly after admission, a history and physical exam was performed with members of [facility], attention invited to their workup which is self-explanatory.  It is to be noted that the patient had described a history of methamphetamine use, they mentioned the use of other drugs including nicotine.  He was noted on his lab tests to have an elevated blood sugar of 166 on one of the labs performed for him and so they suggested to proceed with a hemoglobin A1c to determine the possibility of the patient being or not a diabetic.  Regardless, the physical exam was basically, otherwise, negative.    While the patient was in the facility including the emergency room, multiple labs have been performed including a CBC with differential that had showed completely normal test results.  Rapid influenza A and B by PCR, the same as COVID by PCR showed negative results.  An

## 2024-01-23 ENCOUNTER — HOSPITAL ENCOUNTER (EMERGENCY)
Facility: HOSPITAL | Age: 54
Discharge: HOME | End: 2024-01-23
Payer: MEDICAID

## 2024-01-23 VITALS
DIASTOLIC BLOOD PRESSURE: 72 MMHG | SYSTOLIC BLOOD PRESSURE: 126 MMHG | RESPIRATION RATE: 20 BRPM | OXYGEN SATURATION: 98 % | TEMPERATURE: 99.8 F | HEART RATE: 59 BPM

## 2024-01-23 DIAGNOSIS — J10.1 INFLUENZA A: Primary | ICD-10-CM

## 2024-01-23 LAB
FLUAV RNA SPEC QL NAA+PROBE: DETECTED
FLUBV RNA SPEC QL NAA+PROBE: NOT DETECTED
SARS-COV-2 RNA RESP QL NAA+PROBE: NOT DETECTED

## 2024-01-23 PROCEDURE — 99283 EMERGENCY DEPT VISIT LOW MDM: CPT

## 2024-01-23 PROCEDURE — 87636 SARSCOV2 & INF A&B AMP PRB: CPT

## 2024-01-23 RX ORDER — OSELTAMIVIR PHOSPHATE 75 MG/1
75 CAPSULE ORAL 2 TIMES DAILY
Qty: 10 CAPSULE | Refills: 0 | Status: SHIPPED | OUTPATIENT
Start: 2024-01-23 | End: 2024-01-28

## 2024-01-23 RX ORDER — BENZONATATE 100 MG/1
100 CAPSULE ORAL 2 TIMES DAILY PRN
Qty: 20 CAPSULE | Refills: 0 | Status: SHIPPED | OUTPATIENT
Start: 2024-01-23 | End: 2024-01-30

## 2024-01-23 RX ORDER — ACETAMINOPHEN 325 MG/1
650 TABLET ORAL EVERY 6 HOURS PRN
Qty: 120 TABLET | Refills: 3 | Status: SHIPPED | OUTPATIENT
Start: 2024-01-23

## 2024-01-23 RX ORDER — IBUPROFEN 600 MG/1
600 TABLET ORAL EVERY 6 HOURS PRN
Qty: 120 TABLET | Refills: 0 | Status: SHIPPED | OUTPATIENT
Start: 2024-01-23

## 2024-01-23 ASSESSMENT — ENCOUNTER SYMPTOMS
RHINORRHEA: 1
COUGH: 0
NAUSEA: 0
EYE DISCHARGE: 0
DIARRHEA: 0
SORE THROAT: 0
ABDOMINAL PAIN: 0
SHORTNESS OF BREATH: 0

## 2024-01-23 ASSESSMENT — PAIN SCALES - GENERAL: PAINLEVEL_OUTOF10: 5

## 2024-01-23 NOTE — ED PROVIDER NOTES
EMERGENCY DEPARTMENT HISTORY AND PHYSICAL EXAM    Date: 1/23/2024  Patient Name: Justice Marcano    History of Presenting Illness     Chief Complaint   Patient presents with    Influenza         History Provided By: Patient      Additional History (Context): Justice Marcano is a 53 y.o. male with a history of bradycardia presenting today for flulike symptoms.  Patient reports this began shortly after being around his 2 children who recently tested positive for influenza.  Patient denies any recent traveling      PCP: No primary care provider on file.    No current facility-administered medications for this encounter.     Current Outpatient Medications   Medication Sig Dispense Refill    oseltamivir (TAMIFLU) 75 MG capsule Take 1 capsule by mouth 2 times daily for 5 days 10 capsule 0    acetaminophen (AMINOFEN) 325 MG tablet Take 2 tablets by mouth every 6 hours as needed for Pain 120 tablet 3    ibuprofen (IBU) 600 MG tablet Take 1 tablet by mouth every 6 hours as needed for Pain 120 tablet 0    benzonatate (TESSALON) 100 MG capsule Take 1 capsule by mouth 2 times daily as needed for Cough 20 capsule 0    citalopram (CELEXA) 20 MG tablet Take 1 tablet by mouth daily 30 tablet 0    naltrexone (DEPADE) 50 MG tablet Take 1 tablet by mouth daily (with breakfast) 30 tablet 0    QUEtiapine (SEROQUEL) 50 MG tablet Take 1 tablet by mouth nightly 30 tablet 0       Past History     Past Medical History:  Past Medical History:   Diagnosis Date    Bradycardia     History of myocardial perfusion scan 03/05/2014    No ischemia or prior infarction.  EF 53%.  No RWMA.  Borderline positive EKG on pharm stress test.       Past Surgical History:  Past Surgical History:   Procedure Laterality Date    CARDIAC CATHETERIZATION  3/7/14       Family History:  Family History   Problem Relation Age of Onset    Heart Attack Father     Heart Surgery Father     Coronary Art Dis Father        Social History:  Social History     Tobacco Use

## 2024-07-01 ENCOUNTER — HOSPITAL ENCOUNTER (EMERGENCY)
Facility: HOSPITAL | Age: 54
Discharge: HOME | End: 2024-07-01
Attending: EMERGENCY MEDICINE
Payer: MEDICAID

## 2024-07-01 VITALS
WEIGHT: 155 LBS | HEIGHT: 66 IN | TEMPERATURE: 98.1 F | DIASTOLIC BLOOD PRESSURE: 78 MMHG | HEART RATE: 54 BPM | SYSTOLIC BLOOD PRESSURE: 145 MMHG | OXYGEN SATURATION: 97 % | BODY MASS INDEX: 24.91 KG/M2 | RESPIRATION RATE: 16 BRPM

## 2024-07-01 DIAGNOSIS — M79.605 LEFT LEG PAIN: ICD-10-CM

## 2024-07-01 DIAGNOSIS — J20.9 ACUTE BRONCHITIS, UNSPECIFIED ORGANISM: Primary | ICD-10-CM

## 2024-07-01 LAB — D DIMER PPP FEU-MCNC: <0.27 UG/ML(FEU)

## 2024-07-01 PROCEDURE — 99283 EMERGENCY DEPT VISIT LOW MDM: CPT

## 2024-07-01 PROCEDURE — 85379 FIBRIN DEGRADATION QUANT: CPT

## 2024-07-01 RX ORDER — ALBUTEROL SULFATE 90 UG/1
2 AEROSOL, METERED RESPIRATORY (INHALATION) 4 TIMES DAILY PRN
Qty: 54 G | Refills: 1 | Status: SHIPPED | OUTPATIENT
Start: 2024-07-01

## 2024-07-01 ASSESSMENT — PAIN DESCRIPTION - LOCATION: LOCATION: LEG

## 2024-07-01 ASSESSMENT — PAIN - FUNCTIONAL ASSESSMENT: PAIN_FUNCTIONAL_ASSESSMENT: 0-10

## 2024-07-01 ASSESSMENT — PAIN DESCRIPTION - ORIENTATION: ORIENTATION: LEFT;UPPER;INNER

## 2024-07-01 ASSESSMENT — LIFESTYLE VARIABLES
HOW OFTEN DO YOU HAVE A DRINK CONTAINING ALCOHOL: MONTHLY OR LESS
HOW MANY STANDARD DRINKS CONTAINING ALCOHOL DO YOU HAVE ON A TYPICAL DAY: PATIENT DOES NOT DRINK

## 2024-07-01 ASSESSMENT — PAIN SCALES - GENERAL: PAINLEVEL_OUTOF10: 8

## 2024-07-02 NOTE — ED PROVIDER NOTES
EMERGENCY DEPARTMENT HISTORY AND PHYSICAL EXAM    10:05 PM EDT seen at this time in room QA        Date: 7/1/2024  Patient Name: Justice Marcano    History of Presenting Illness     Chief Complaint   Patient presents with    Leg Pain    Wheezing         History Provided By: patient    Additional History (Context): Justice Marcano is a 53 y.o. male presents with no contributory medical history but does not see a doctor often and he is a smoker has 1) left anterior proximal thigh pain he recently started exercising and doing squats does not recall a specific injury but googled and is concerned about a blood clot.  No shortness of breath.  2) wheezing for 2 years he attributes it to smoking has developed a cough over the last 2 weeks.  Not specifically short of breath and certainly no chest pain      PCP: No primary care provider on file.    Chief Complaint:   Duration:    Timing:    Location:   Quality:   Severity:   Modifying Factors:   Associated Symptoms:       No current facility-administered medications for this encounter.     Current Outpatient Medications   Medication Sig Dispense Refill    albuterol sulfate HFA (VENTOLIN HFA) 108 (90 Base) MCG/ACT inhaler Inhale 2 puffs into the lungs 4 times daily as needed for Wheezing 54 g 1    acetaminophen (AMINOFEN) 325 MG tablet Take 2 tablets by mouth every 6 hours as needed for Pain 120 tablet 3    ibuprofen (IBU) 600 MG tablet Take 1 tablet by mouth every 6 hours as needed for Pain 120 tablet 0    citalopram (CELEXA) 20 MG tablet Take 1 tablet by mouth daily 30 tablet 0    naltrexone (DEPADE) 50 MG tablet Take 1 tablet by mouth daily (with breakfast) (Patient not taking: Reported on 7/1/2024) 30 tablet 0    QUEtiapine (SEROQUEL) 50 MG tablet Take 1 tablet by mouth nightly (Patient not taking: Reported on 7/1/2024) 30 tablet 0       Past History     Past Medical History:  Past Medical History:   Diagnosis Date    Bradycardia     History of myocardial perfusion scan

## 2024-07-02 NOTE — ED TRIAGE NOTES
A&O male with c/o left upper inner thigh pain x 4 days. Denies injury/trauma. Also reports intermittent wheezing for some time, possible related to smoking.